# Patient Record
Sex: FEMALE | Race: BLACK OR AFRICAN AMERICAN | Employment: UNEMPLOYED | URBAN - METROPOLITAN AREA
[De-identification: names, ages, dates, MRNs, and addresses within clinical notes are randomized per-mention and may not be internally consistent; named-entity substitution may affect disease eponyms.]

---

## 2018-10-24 ENCOUNTER — HOSPITAL ENCOUNTER (EMERGENCY)
Age: 38
Discharge: HOME | End: 2018-10-24
Attending: EMERGENCY MEDICINE
Payer: MEDICAID

## 2018-10-24 VITALS
HEART RATE: 71 BPM | WEIGHT: 210 LBS | DIASTOLIC BLOOD PRESSURE: 72 MMHG | SYSTOLIC BLOOD PRESSURE: 127 MMHG | BODY MASS INDEX: 33.89 KG/M2 | RESPIRATION RATE: 16 BRPM | TEMPERATURE: 98 F | OXYGEN SATURATION: 100 %

## 2018-10-24 DIAGNOSIS — A59.9 TRICHOMONIASIS: Primary | ICD-10-CM

## 2018-10-24 LAB
BACTERIA WET PREP: ABNORMAL
BACTERIA: ABNORMAL /HPF
BILIRUBIN URINE: NEGATIVE
BLOOD, URINE: ABNORMAL
CLARITY: ABNORMAL
CLUE CELLS: ABNORMAL
COLOR: YELLOW
EPITHELIAL CELLS WET PREP: ABNORMAL
EPITHELIAL CELLS, UA: ABNORMAL /HPF
GLUCOSE URINE: NEGATIVE MG/DL
HCG(URINE) PREGNANCY TEST: NEGATIVE
KETONES, URINE: NEGATIVE MG/DL
LEUKOCYTE ESTERASE, URINE: ABNORMAL
MICROSCOPIC EXAMINATION: YES
NITRITE, URINE: NEGATIVE
PH UA: 6
PROTEIN UA: NEGATIVE MG/DL
RBC UA: ABNORMAL /HPF (ref 0–2)
RBC WET PREP: ABNORMAL
SOURCE WET PREP: ABNORMAL
SPECIFIC GRAVITY UA: >=1.03
TRICHOMONAS PREP: ABNORMAL
TRICHOMONAS: PRESENT /HPF
URINE TYPE: ABNORMAL
UROBILINOGEN, URINE: 1 E.U./DL
WBC UA: ABNORMAL /HPF (ref 0–5)
WBC WET PREP: ABNORMAL
YEAST WET PREP: ABNORMAL

## 2018-10-24 PROCEDURE — 96372 THER/PROPH/DIAG INJ SC/IM: CPT

## 2018-10-24 PROCEDURE — 81001 URINALYSIS AUTO W/SCOPE: CPT

## 2018-10-24 PROCEDURE — 84703 CHORIONIC GONADOTROPIN ASSAY: CPT

## 2018-10-24 PROCEDURE — 6370000000 HC RX 637 (ALT 250 FOR IP): Performed by: EMERGENCY MEDICINE

## 2018-10-24 PROCEDURE — 6360000002 HC RX W HCPCS: Performed by: EMERGENCY MEDICINE

## 2018-10-24 PROCEDURE — 87591 N.GONORRHOEAE DNA AMP PROB: CPT

## 2018-10-24 PROCEDURE — 87491 CHLMYD TRACH DNA AMP PROBE: CPT

## 2018-10-24 PROCEDURE — 87210 SMEAR WET MOUNT SALINE/INK: CPT

## 2018-10-24 PROCEDURE — 99283 EMERGENCY DEPT VISIT LOW MDM: CPT

## 2018-10-24 RX ORDER — METRONIDAZOLE 500 MG/1
500 TABLET ORAL 2 TIMES DAILY
Qty: 14 TABLET | Refills: 0 | Status: SHIPPED | OUTPATIENT
Start: 2018-10-24 | End: 2018-10-31

## 2018-10-24 RX ORDER — AZITHROMYCIN 250 MG/1
1000 TABLET, FILM COATED ORAL ONCE
Status: COMPLETED | OUTPATIENT
Start: 2018-10-24 | End: 2018-10-24

## 2018-10-24 RX ORDER — CEFTRIAXONE SODIUM 250 MG/1
250 INJECTION, POWDER, FOR SOLUTION INTRAMUSCULAR; INTRAVENOUS ONCE
Status: COMPLETED | OUTPATIENT
Start: 2018-10-24 | End: 2018-10-24

## 2018-10-24 RX ADMIN — AZITHROMYCIN 1000 MG: 250 TABLET, FILM COATED ORAL at 00:45

## 2018-10-24 RX ADMIN — CEFTRIAXONE SODIUM 250 MG: 250 INJECTION, POWDER, FOR SOLUTION INTRAMUSCULAR; INTRAVENOUS at 00:45

## 2018-10-24 ASSESSMENT — PAIN SCALES - GENERAL: PAINLEVEL_OUTOF10: 0

## 2018-10-25 LAB
C TRACH DNA GENITAL QL NAA+PROBE: NEGATIVE
N. GONORRHOEAE DNA: NEGATIVE

## 2019-05-13 ENCOUNTER — HOSPITAL ENCOUNTER (EMERGENCY)
Age: 39
Discharge: HOME | End: 2019-05-13
Attending: EMERGENCY MEDICINE

## 2019-05-13 VITALS
HEART RATE: 70 BPM | OXYGEN SATURATION: 97 % | TEMPERATURE: 98.5 F | WEIGHT: 212.38 LBS | BODY MASS INDEX: 34.28 KG/M2 | DIASTOLIC BLOOD PRESSURE: 65 MMHG | SYSTOLIC BLOOD PRESSURE: 132 MMHG

## 2019-05-13 DIAGNOSIS — B96.89 BACTERIAL VAGINOSIS: ICD-10-CM

## 2019-05-13 DIAGNOSIS — N76.0 BACTERIAL VAGINOSIS: ICD-10-CM

## 2019-05-13 DIAGNOSIS — A64 STD (FEMALE): Primary | ICD-10-CM

## 2019-05-13 LAB
BACTERIA WET PREP: ABNORMAL
BACTERIA: ABNORMAL /HPF
BILIRUBIN URINE: NEGATIVE
BLOOD, URINE: ABNORMAL
CLARITY: CLEAR
CLUE CELLS: ABNORMAL
COLOR: YELLOW
EPITHELIAL CELLS WET PREP: ABNORMAL
EPITHELIAL CELLS, UA: ABNORMAL /HPF
GLUCOSE URINE: NEGATIVE MG/DL
KETONES, URINE: ABNORMAL MG/DL
LEUKOCYTE ESTERASE, URINE: NEGATIVE
MICROSCOPIC EXAMINATION: YES
MUCUS: ABNORMAL /LPF
NITRITE, URINE: NEGATIVE
PH UA: 6 (ref 5–8)
PROTEIN UA: NEGATIVE MG/DL
RBC UA: ABNORMAL /HPF (ref 0–2)
RBC WET PREP: ABNORMAL
SOURCE WET PREP: ABNORMAL
SPECIFIC GRAVITY UA: 1.02 (ref 1–1.03)
TRICHOMONAS PREP: ABNORMAL
URINE REFLEX TO CULTURE: ABNORMAL
URINE TYPE: ABNORMAL
UROBILINOGEN, URINE: 0.2 E.U./DL
WBC UA: ABNORMAL /HPF (ref 0–5)
WBC WET PREP: ABNORMAL
YEAST WET PREP: ABNORMAL

## 2019-05-13 PROCEDURE — 99283 EMERGENCY DEPT VISIT LOW MDM: CPT

## 2019-05-13 PROCEDURE — 6360000002 HC RX W HCPCS: Performed by: EMERGENCY MEDICINE

## 2019-05-13 PROCEDURE — 87210 SMEAR WET MOUNT SALINE/INK: CPT

## 2019-05-13 PROCEDURE — 96372 THER/PROPH/DIAG INJ SC/IM: CPT

## 2019-05-13 PROCEDURE — 6370000000 HC RX 637 (ALT 250 FOR IP): Performed by: EMERGENCY MEDICINE

## 2019-05-13 PROCEDURE — 2580000003 HC RX 258

## 2019-05-13 PROCEDURE — 87491 CHLMYD TRACH DNA AMP PROBE: CPT

## 2019-05-13 PROCEDURE — 87591 N.GONORRHOEAE DNA AMP PROB: CPT

## 2019-05-13 PROCEDURE — 81001 URINALYSIS AUTO W/SCOPE: CPT

## 2019-05-13 RX ORDER — CEFTRIAXONE SODIUM 250 MG/1
250 INJECTION, POWDER, FOR SOLUTION INTRAMUSCULAR; INTRAVENOUS ONCE
Status: COMPLETED | OUTPATIENT
Start: 2019-05-13 | End: 2019-05-13

## 2019-05-13 RX ORDER — METRONIDAZOLE 500 MG/1
500 TABLET ORAL 2 TIMES DAILY
Qty: 14 TABLET | Refills: 0 | Status: SHIPPED | OUTPATIENT
Start: 2019-05-13 | End: 2019-05-20

## 2019-05-13 RX ORDER — AZITHROMYCIN 250 MG/1
1000 TABLET, FILM COATED ORAL ONCE
Status: COMPLETED | OUTPATIENT
Start: 2019-05-13 | End: 2019-05-13

## 2019-05-13 RX ADMIN — AZITHROMYCIN MONOHYDRATE 1000 MG: 250 TABLET ORAL at 12:06

## 2019-05-13 RX ADMIN — CEFTRIAXONE SODIUM 250 MG: 250 INJECTION, POWDER, FOR SOLUTION INTRAMUSCULAR; INTRAVENOUS at 12:07

## 2019-05-13 RX ADMIN — IBUPROFEN 600 MG: 200 TABLET, FILM COATED ORAL at 11:38

## 2019-05-13 RX ADMIN — WATER: 1 INJECTION INTRAMUSCULAR; INTRAVENOUS; SUBCUTANEOUS at 12:07

## 2019-05-13 ASSESSMENT — PAIN DESCRIPTION - DESCRIPTORS: DESCRIPTORS: PRESSURE

## 2019-05-13 ASSESSMENT — PAIN DESCRIPTION - ORIENTATION: ORIENTATION: LOWER

## 2019-05-13 ASSESSMENT — PAIN SCALES - GENERAL
PAINLEVEL_OUTOF10: 7

## 2019-05-13 ASSESSMENT — PAIN DESCRIPTION - LOCATION: LOCATION: BACK

## 2019-05-13 ASSESSMENT — PAIN DESCRIPTION - PAIN TYPE: TYPE: ACUTE PAIN

## 2019-05-13 NOTE — ED PROVIDER NOTES
CHIEF COMPLAINT  Vaginal Discharge      HISTORY OF PRESENT ILLNESS  Elisabeth Garcia is a 41 y.o. female, who presents to the ED with a several day history of whitish vaginal discharge with odor associated with moderate back and lower abdominal pain urinary itching and nausea no fever no vomiting. Patient is sexually active with 1 male partner does not use barrier protection. She is status post hysterectomy for dysmenorrhea she is . She has a history of trichomonas bacterial vaginosis and gonorrhea in the past.  Review of Systems    I have reviewed the following from the nursing documentation. Past Medical History:   Diagnosis Date    Gonorrhea     past.     Past Surgical History:   Procedure Laterality Date    HYSTERECTOMY       History reviewed. No pertinent family history.   Social History     Socioeconomic History    Marital status: Single     Spouse name: Not on file    Number of children: Not on file    Years of education: Not on file    Highest education level: Not on file   Occupational History    Not on file   Social Needs    Financial resource strain: Not on file    Food insecurity:     Worry: Not on file     Inability: Not on file    Transportation needs:     Medical: Not on file     Non-medical: Not on file   Tobacco Use    Smoking status: Current Every Day Smoker     Packs/day: 0.50     Types: Cigarettes    Smokeless tobacco: Never Used   Substance and Sexual Activity    Alcohol use: No    Drug use: No    Sexual activity: Not on file   Lifestyle    Physical activity:     Days per week: Not on file     Minutes per session: Not on file    Stress: Not on file   Relationships    Social connections:     Talks on phone: Not on file     Gets together: Not on file     Attends [employer] service: Not on file     Active member of club or organization: Not on file     Attends meetings of clubs or organizations: Not on file     Relationship status: Not on file    Intimate partner components within normal limits    Narrative:     Performed at:  [facility]  [address],  [place] [facility] [place]   Phone [phone]   URINE RT REFLEX TO CULTURE - Abnormal; Notable for the following components:    Ketones, Urine TRACE (*)     Blood, Urine TRACE-INTACT (*)     All other components within normal limits    Narrative:     Performed at:  [facility]  [address],  [place] [address]   Phone [phone]   MICROSCOPIC URINALYSIS - Abnormal; Notable for the following components:    Mucus, UA 1+ (*)     RBC, UA 3-5 (*)     Bacteria, UA 2+ (*)     All other components within normal limits    Narrative:     Performed at:  [facility]  [address],  [place] [address]   Phone [phone]   C. TRACHOMATIS N.GONORRHOEAE DNA        RADIOLOGY  No orders to display       If EKG done, EKG was interpreted independently by me    PROCEDURES  Procedures    EDCOURSE/MDM  Patient seen and evaluated. Old records selectively reviewed if pertinent. Labs and imaging reviewed and results discussed with patient. I considered urinary tract infection, STD including PID, bacterial vaginosis, vaginitis. TOA unlikely. Symptoms are bilateral, there are no masses on physical examination, no fever or other signs of sepsis/bacteremia. Given the symptoms of back and lower abdominal pain as well as discharge and the physical limitation findings of adnexal tenderness we will treat as possible PID/STD. Wet smear also shows evidence of possible BV will discharge patient with Flagyl for this    If Shakira Hill is discharged, I discussed with Shakira Hill and/or family the exam results, diagnosis, care, prognosis, reasons to return and the importance of follow up. Patient and/or family is in agreement with plan and all questions have been answered.   Specific discharge instructions explained, including reasons to return to the emergency department. If discharged, patient was given scripts for the following medications. New Prescriptions    METRONIDAZOLE (FLAGYL) 500 MG TABLET    Take 1 tablet by mouth 2 times daily for 7 days       CLINICAL IMPRESSION  1. STD (female)    2. Bacterial vaginosis        /65   Pulse 70   Temp 98.5 °F (36.9 °C) (Oral)   Wt 96.3 kg (212 lb 6 oz)   SpO2 97%   BMI 34.28 kg/m²     DISPOSITION  Christina Khanna was discharged in stable condition.                    Lucio Diaz MD  05/13/19 4233

## 2019-05-13 NOTE — ED NOTES
Patient c/o white, malodorous vaginal discharge. Patient states she is concerned about STD.      Karen Clifford RN  05/13/19 6959

## 2019-05-15 LAB
C TRACH DNA GENITAL QL NAA+PROBE: NEGATIVE
N. GONORRHOEAE DNA: NEGATIVE

## 2020-08-23 ENCOUNTER — HOSPITAL ENCOUNTER (EMERGENCY)
Age: 40
Discharge: HOME | End: 2020-08-23
Attending: EMERGENCY MEDICINE
Payer: MEDICAID

## 2020-08-23 VITALS
SYSTOLIC BLOOD PRESSURE: 114 MMHG | DIASTOLIC BLOOD PRESSURE: 68 MMHG | HEART RATE: 98 BPM | BODY MASS INDEX: 34.99 KG/M2 | OXYGEN SATURATION: 100 % | TEMPERATURE: 98.1 F | RESPIRATION RATE: 18 BRPM | WEIGHT: 210 LBS | HEIGHT: 65 IN

## 2020-08-23 PROCEDURE — 99282 EMERGENCY DEPT VISIT SF MDM: CPT

## 2020-08-23 RX ORDER — DIPHENHYDRAMINE HCL 25 MG
25 CAPSULE ORAL EVERY 6 HOURS PRN
Qty: 30 CAPSULE | Refills: 0 | Status: SHIPPED | OUTPATIENT
Start: 2020-08-23 | End: 2020-09-02

## 2020-08-23 ASSESSMENT — PAIN DESCRIPTION - DESCRIPTORS: DESCRIPTORS: ITCHING

## 2020-08-23 ASSESSMENT — PAIN DESCRIPTION - LOCATION: LOCATION: ABDOMEN;BACK

## 2020-08-23 ASSESSMENT — PAIN DESCRIPTION - PAIN TYPE: TYPE: ACUTE PAIN

## 2020-08-23 NOTE — ED PROVIDER NOTES
EMERGENCY DEPARTMENT PHYSICIAN DOCUMENTATION      CHIEF COMPLAINT  SKIN ISSUE    Patient information was obtained from patient. History/Exam limitations: none. HISTORY OF PRESENT ILLNESS  Don Todd is a 46 y.o. female with complaint of SKIN ISSUE    Location back, bilateral flanks, abdomen  Onset several days . Caused by unknown.  not painful, no radiation, worse with nothing  no fevers, no discharge  +mildly itching      REVIEW OF SYSTEMS  A full 10 point Review of Systems was performed and is negative aside from pertinent positives mentioned in HPI    ALLERGIES:  No Known Allergies    PAST HISTORY  Past Medical History:   Diagnosis Date    Gonorrhea     past.       History reviewed. No pertinent family history. No current facility-administered medications on file prior to encounter. Current Outpatient Medications on File Prior to Encounter   Medication Sig Dispense Refill    cephALEXin (KEFLEX) 500 MG capsule Take 1 capsule by mouth 4 times daily 28 capsule 0       Social History     Tobacco Use    Smoking status: Current Every Day Smoker     Packs/day: 0.50     Types: Cigarettes    Smokeless tobacco: Never Used   Substance Use Topics    Alcohol use: No    Drug use: No         EXAM:   Presentation Vital Signs: /68   Pulse 98   Temp 98.1 °F (36.7 °C)   Resp 18   Ht 5' 5\" (1.651 m)   Wt 210 lb (95.3 kg)   SpO2 100%   BMI 34.95 kg/m²     General: Well nourished, no acute distress  Head: No traumatic injury  ENT: MMM, no facial asymmetry, no nasal discharge  Eyes: EOM  Neck: No tracheal deviation or stridor  Lungs: No respiratory distress  Skin: on back, sides of trunk, and abdomen there are several scattered oblong/oval shaped slightly erythematous and raised lesions in a \"[place] pattern\"  Extremities: Normal ROM of bilateral upper extremities at shoulders, elbows, wrists; normal ROM of bilateral LE at hips and knees. Neurologic: Alert, oriented x 3.   No focal deficits upon moving arms and legs  Psychiatric: Appropriate demeanor without agitation or internal stimulation      Cathy Qureshi 16  Patient presents with pityriasis rosea--see rash description in exam.    Wait-and-see antifungal--but there is no central clearing and this appears quite classic pityriasis. Also benadryl for itch. Fu with derm if not resolved. DISPOSITION  Home    IMPRESSION:  1. Pityriasis rosea            This medical chart used with aid of transcription software. As such, there may be inadvertent errors in transcription of spellings and words despite physician's attempts to correct all possible errors.            Jany Leon MD  08/23/20 1218

## 2021-04-16 ENCOUNTER — HOSPITAL ENCOUNTER (EMERGENCY)
Age: 41
Discharge: HOME | End: 2021-04-16
Attending: EMERGENCY MEDICINE
Payer: MEDICAID

## 2021-04-16 VITALS
RESPIRATION RATE: 17 BRPM | TEMPERATURE: 98.4 F | WEIGHT: 238.6 LBS | HEART RATE: 84 BPM | BODY MASS INDEX: 39.71 KG/M2 | SYSTOLIC BLOOD PRESSURE: 121 MMHG | DIASTOLIC BLOOD PRESSURE: 67 MMHG | OXYGEN SATURATION: 99 %

## 2021-04-16 DIAGNOSIS — B96.89 BACTERIAL VAGINOSIS: Primary | ICD-10-CM

## 2021-04-16 DIAGNOSIS — N76.0 BACTERIAL VAGINOSIS: Primary | ICD-10-CM

## 2021-04-16 LAB
BACTERIA WET PREP: ABNORMAL
BACTERIA: ABNORMAL /HPF
BILIRUBIN URINE: NEGATIVE
BLOOD, URINE: ABNORMAL
CLARITY: CLEAR
CLUE CELLS: ABNORMAL
COLOR: YELLOW
EPITHELIAL CELLS WET PREP: ABNORMAL
EPITHELIAL CELLS, UA: ABNORMAL /HPF (ref 0–5)
GLUCOSE URINE: NEGATIVE MG/DL
KETONES, URINE: NEGATIVE MG/DL
LEUKOCYTE ESTERASE, URINE: ABNORMAL
MICROSCOPIC EXAMINATION: YES
NITRITE, URINE: NEGATIVE
PH UA: 5.5 (ref 5–8)
PROTEIN UA: NEGATIVE MG/DL
RBC UA: ABNORMAL /HPF (ref 0–4)
RBC WET PREP: ABNORMAL
SOURCE WET PREP: ABNORMAL
SPECIFIC GRAVITY UA: 1.02 (ref 1–1.03)
TRICHOMONAS PREP: ABNORMAL
URINE REFLEX TO CULTURE: ABNORMAL
URINE TYPE: ABNORMAL
UROBILINOGEN, URINE: 0.2 E.U./DL
WBC UA: ABNORMAL /HPF (ref 0–5)
WBC WET PREP: ABNORMAL
YEAST WET PREP: ABNORMAL

## 2021-04-16 PROCEDURE — 99283 EMERGENCY DEPT VISIT LOW MDM: CPT

## 2021-04-16 PROCEDURE — 87210 SMEAR WET MOUNT SALINE/INK: CPT

## 2021-04-16 PROCEDURE — 81001 URINALYSIS AUTO W/SCOPE: CPT

## 2021-04-16 RX ORDER — METRONIDAZOLE 500 MG/1
500 TABLET ORAL 2 TIMES DAILY
Qty: 14 TABLET | Refills: 0 | Status: SHIPPED | OUTPATIENT
Start: 2021-04-16 | End: 2021-04-23

## 2021-04-16 NOTE — ED PROVIDER NOTES
[facility] EMERGENCY DEPT VISIT      Patient Identification  Arpit Valladares is a 45 y.o. female. Chief Complaint   Urinary Tract Infection      History of Present Illness: This is a  45 y.o. female who presents ambulatory  to the ED with complaints of one week h/o urinary frequency and urgency with some occasional incontinence. No fever. No abdominal pain or flank pain. Mild low back pain. No nausea or vomiting. Had one episode of diarrhea this am. Has noticed a foul smell to urine and perineal region. Mild discharge. Has had hysterectomy. Past Medical History:   Diagnosis Date    Gonorrhea     past.       Past Surgical History:   Procedure Laterality Date    HYSTERECTOMY         No current facility-administered medications for this encounter.      Current Outpatient Medications:     ALPRAZolam (XANAX PO), Take 1 mg by mouth every 6 hours as needed, Disp: , Rfl:     metroNIDAZOLE (FLAGYL) 500 MG tablet, Take 1 tablet by mouth 2 times daily for 7 days, Disp: 14 tablet, Rfl: 0    No Known Allergies    Social History     Socioeconomic History    Marital status: Single     Spouse name: Not on file    Number of children: Not on file    Years of education: Not on file    Highest education level: Not on file   Occupational History    Not on file   Social Needs    Financial resource strain: Not on file    Food insecurity     Worry: Not on file     Inability: Not on file    Transportation needs     Medical: Not on file     Non-medical: Not on file   Tobacco Use    Smoking status: Current Every Day Smoker     Packs/day: 0.50     Types: Cigarettes    Smokeless tobacco: Never Used   Substance and Sexual Activity    Alcohol use: No    Drug use: No    Sexual activity: Not on file   Lifestyle    Physical activity     Days per week: Not on file     Minutes per session: Not on file    Stress: Not on file   Relationships    Social connections     Talks on phone: Not on file     Gets together: Not on file Attends [employer] service: Not on file     Active member of club or organization: Not on file     Attends meetings of clubs or organizations: Not on file     Relationship status: Not on file    Intimate partner violence     Fear of current or ex partner: Not on file     Emotionally abused: Not on file     Physically abused: Not on file     Forced sexual activity: Not on file   Other Topics Concern    Not on file   Social History Narrative    Not on file       Nursing Notes Reviewed      ROS:  General: no fever  ENT: no sinus congestion, no sore throat  RESP: no cough, no shortness of breath  CARDIAC: no chest pain  GI: no abdominal pain, no vomiting, no diarrhea  : + dysuria, no hematuria, no retention, + incontinence  Musculoskeletal: no arthralgia, no myalgia, no back pain,  no joint swelling  NEURO: no headache, no numbness, no weakness, no dizziness  DERM: no rash, no erythema, no ecchymosis, no wounds      PHYSICAL EXAM:  GENERAL APPEARANCE: Lance Goldsmith is in no acute respiratory distress. Awake and alert. VITAL SIGNS:   ED Triage Vitals [04/16/21 1131]   Enc Vitals Group      /67      Pulse 84      Resp 17      Temp 98.4 °F (36.9 °C)      Temp Source Oral      SpO2 99 %      Weight 238 lb 9.6 oz (108.2 kg)      Height       Head Circumference       Peak Flow       Pain Score       Pain Loc       Pain Edu? Excl. in [address]? HEAD: Normocephalic, atraumatic. EYES:  Extraocular muscles are intact. Conjunctivas are pink. Negative scleral icterus. ENT:  Mucous membranes are moist.  Pharynx without erythema or exudates. NECK: Nontender and supple. CHEST: Clear to auscultation bilaterally. No rales, rhonchi, or wheezing. HEART:  Regular rate and rhythm. No murmurs. Strong and equal pulses in the upper and lower extremities. ABDOMEN: Soft,  nondistended, positive bowel sounds. abdomen is nontender. No guarding.  No flank tenderness  MUSCULOSKELETAL:  Active range of motion of the upper and lower extremities. No edema. NEUROLOGICAL: Awake, alert and oriented x 3. Power intact in the upper and lower extremities. DERMATOLOGIC: No petechiae, rashes, or ecchymoses. ED COURSE AND MEDICAL DECISION MAKING:      Radiology:  All plain films have been evaluated by myself. They may have been overread by radiologist as noted in chart. Other radiologic studies (i.e. CT, MRI, ultrasounds, etc ) have been interpreted by radiologist.     No orders to display       Labs:  Results for orders placed or performed during the hospital encounter of 04/16/21   Wet prep, genital    Specimen: Vaginal   Result Value Ref Range    Trichomonas Prep None Seen     Yeast, Wet Prep None Seen     Clue Cells, Wet Prep 4+ (A)     WBC, Wet Prep 1+     RBC, Wet Prep None Seen     Epi Cells None Seen     Bacteria 4+     Source Wet Prep Vaginal    Urinalysis Reflex to Culture    Specimen: Urine, clean catch   Result Value Ref Range    Color, UA Yellow Straw/Yellow    Clarity, UA Clear Clear    Glucose, Ur Negative Negative mg/dL    Bilirubin Urine Negative Negative    Ketones, Urine Negative Negative mg/dL    Specific Gravity, UA 1.025 1.005 - 1.030    Blood, Urine TRACE-INTACT (A) Negative    pH, UA 5.5 5.0 - 8.0    Protein, UA Negative Negative mg/dL    Urobilinogen, Urine 0.2 <2.0 E.U./dL    Nitrite, Urine Negative Negative    Leukocyte Esterase, Urine TRACE (A) Negative    Microscopic Examination YES     Urine Type NotGiven     Urine Reflex to Culture Not Indicated    Microscopic Urinalysis   Result Value Ref Range    WBC, UA 3-5 0 - 5 /HPF    RBC, UA 0-2 0 - 4 /HPF    Epithelial Cells, UA 2-5 0 - 5 /HPF    Bacteria, UA 1+ (A) None Seen /HPF       Treatment in the department:  Patient received no meds while in the ED. Medical decision making:  Patient presents to ED with concerns for vaginal irritation and urinary symptoms.     Patient has no fever, toxicity, severe abdominal/pelvic pain, or vomiting and furthermore is hemodynamically stable and is not pregnant. I therefore feel there is low risk for PID, UROSEPSIS, OVARIAN TORSION, ECTOPIC PREGNANCY, MISCARRIAGE, ENDOMETRITIS, OVARIAN CYST RUPTURE WITH THREATENING HEMOPERITONEUM, TUBO-OVARIAN ABSCESS, PYELONEPHRITIS, UROSEPSIS,  I do not feel she requires emergent ultrasound imaging at this time. Clinical Impression:  1. Bacterial vaginosis        Dispo:  Patient will be discharged  at this time. Patient was informed of this decision and agrees with plan. I have discussed lab and xray findings with patient and they understand. Questions were answered to the best of my ability. Discharge vitals:  Blood pressure 121/67, pulse 84, temperature 98.4 °F (36.9 °C), temperature source Oral, resp. rate 17, weight 238 lb 9.6 oz (108.2 kg), SpO2 99 %, not currently breastfeeding. Prescriptions given:   Discharge Medication List as of 4/16/2021 12:42 PM      START taking these medications    Details   metroNIDAZOLE (FLAGYL) 500 MG tablet Take 1 tablet by mouth 2 times daily for 7 days, Disp-14 tablet, R-0Normal               This chart was created using dragon voice recognition software.         Reina Vines MD  04/16/21 2092

## 2021-04-16 NOTE — ED NOTES
Pt states that she has been having 2 days of \"wet\" underwear and thinks she is urinating on herself and states that there is foul odor and she is urinating more frequently.       Warren Cruz RN  04/16/21 9912

## 2021-08-05 ENCOUNTER — HOSPITAL ENCOUNTER (EMERGENCY)
Age: 41
Discharge: HOME | End: 2021-08-05
Attending: STUDENT IN AN ORGANIZED HEALTH CARE EDUCATION/TRAINING PROGRAM
Payer: MEDICAID

## 2021-08-05 ENCOUNTER — APPOINTMENT (OUTPATIENT)
Dept: CT IMAGING | Age: 41
End: 2021-08-05
Payer: MEDICAID

## 2021-08-05 ENCOUNTER — APPOINTMENT (OUTPATIENT)
Dept: GENERAL RADIOLOGY | Age: 41
End: 2021-08-05
Payer: MEDICAID

## 2021-08-05 VITALS
SYSTOLIC BLOOD PRESSURE: 132 MMHG | DIASTOLIC BLOOD PRESSURE: 82 MMHG | WEIGHT: 234.7 LBS | RESPIRATION RATE: 12 BRPM | BODY MASS INDEX: 39.06 KG/M2 | HEART RATE: 102 BPM | TEMPERATURE: 98.2 F | OXYGEN SATURATION: 98 %

## 2021-08-05 DIAGNOSIS — J06.9 VIRAL UPPER RESPIRATORY TRACT INFECTION: Primary | ICD-10-CM

## 2021-08-05 DIAGNOSIS — N30.00 ACUTE CYSTITIS WITHOUT HEMATURIA: ICD-10-CM

## 2021-08-05 LAB
A/G RATIO: 1.4 (ref 1.1–2.2)
ALBUMIN SERPL-MCNC: 4.8 G/DL (ref 3.4–5)
ALP BLD-CCNC: 84 U/L (ref 40–129)
ALT SERPL-CCNC: 17 U/L (ref 10–40)
ANION GAP SERPL CALCULATED.3IONS-SCNC: 14 MMOL/L (ref 3–16)
AST SERPL-CCNC: 19 U/L (ref 15–37)
BACTERIA: ABNORMAL /HPF
BASOPHILS ABSOLUTE: 0.1 K/UL (ref 0–0.2)
BASOPHILS RELATIVE PERCENT: 0.7 %
BILIRUB SERPL-MCNC: <0.2 MG/DL (ref 0–1)
BILIRUBIN URINE: NEGATIVE
BLOOD, URINE: ABNORMAL
BUN BLDV-MCNC: 3 MG/DL (ref 7–20)
CALCIUM SERPL-MCNC: 9.6 MG/DL (ref 8.3–10.6)
CHLORIDE BLD-SCNC: 99 MMOL/L (ref 99–110)
CLARITY: ABNORMAL
CO2: 25 MMOL/L (ref 21–32)
COLOR: YELLOW
CREAT SERPL-MCNC: <0.5 MG/DL (ref 0.6–1.1)
EKG ATRIAL RATE: 109 BPM
EKG DIAGNOSIS: NORMAL
EKG P AXIS: 59 DEGREES
EKG P-R INTERVAL: 158 MS
EKG Q-T INTERVAL: 334 MS
EKG QRS DURATION: 76 MS
EKG QTC CALCULATION (BAZETT): 449 MS
EKG R AXIS: 36 DEGREES
EKG T AXIS: 39 DEGREES
EKG VENTRICULAR RATE: 109 BPM
EOSINOPHILS ABSOLUTE: 0.2 K/UL (ref 0–0.6)
EOSINOPHILS RELATIVE PERCENT: 1.4 %
EPITHELIAL CELLS, UA: ABNORMAL /HPF (ref 0–5)
GFR AFRICAN AMERICAN: >60
GFR NON-AFRICAN AMERICAN: >60
GLOBULIN: 3.5 G/DL
GLUCOSE BLD-MCNC: 155 MG/DL (ref 70–99)
GLUCOSE URINE: NEGATIVE MG/DL
HCG(URINE) PREGNANCY TEST: NEGATIVE
HCT VFR BLD CALC: 44.3 % (ref 36–48)
HEMOGLOBIN: 15.4 G/DL (ref 12–16)
KETONES, URINE: ABNORMAL MG/DL
LEUKOCYTE ESTERASE, URINE: NEGATIVE
LYMPHOCYTES ABSOLUTE: 2.2 K/UL (ref 1–5.1)
LYMPHOCYTES RELATIVE PERCENT: 20.9 %
MCH RBC QN AUTO: 30.4 PG (ref 26–34)
MCHC RBC AUTO-ENTMCNC: 34.7 G/DL (ref 31–36)
MCV RBC AUTO: 87.5 FL (ref 80–100)
MICROSCOPIC EXAMINATION: YES
MONOCYTES ABSOLUTE: 1 K/UL (ref 0–1.3)
MONOCYTES RELATIVE PERCENT: 9.8 %
NEUTROPHILS ABSOLUTE: 7.1 K/UL (ref 1.7–7.7)
NEUTROPHILS RELATIVE PERCENT: 67.2 %
NITRITE, URINE: NEGATIVE
PDW BLD-RTO: 13.3 % (ref 12.4–15.4)
PH UA: 5.5 (ref 5–8)
PLATELET # BLD: 316 K/UL (ref 135–450)
PMV BLD AUTO: 6.9 FL (ref 5–10.5)
POTASSIUM REFLEX MAGNESIUM: 3.6 MMOL/L (ref 3.5–5.1)
PROTEIN UA: NEGATIVE MG/DL
RBC # BLD: 5.07 M/UL (ref 4–5.2)
RBC UA: ABNORMAL /HPF (ref 0–4)
SARS-COV-2: NOT DETECTED
SODIUM BLD-SCNC: 138 MMOL/L (ref 136–145)
SPECIFIC GRAVITY UA: >=1.03 (ref 1–1.03)
TOTAL PROTEIN: 8.3 G/DL (ref 6.4–8.2)
TROPONIN: <0.01 NG/ML
URINE REFLEX TO CULTURE: YES
URINE TYPE: ABNORMAL
UROBILINOGEN, URINE: 0.2 E.U./DL
WBC # BLD: 10.5 K/UL (ref 4–11)
WBC UA: ABNORMAL /HPF (ref 0–5)

## 2021-08-05 PROCEDURE — 81001 URINALYSIS AUTO W/SCOPE: CPT

## 2021-08-05 PROCEDURE — 2580000003 HC RX 258: Performed by: STUDENT IN AN ORGANIZED HEALTH CARE EDUCATION/TRAINING PROGRAM

## 2021-08-05 PROCEDURE — 93010 ELECTROCARDIOGRAM REPORT: CPT | Performed by: INTERNAL MEDICINE

## 2021-08-05 PROCEDURE — U0005 INFEC AGEN DETEC AMPLI PROBE: HCPCS

## 2021-08-05 PROCEDURE — U0003 INFECTIOUS AGENT DETECTION BY NUCLEIC ACID (DNA OR RNA); SEVERE ACUTE RESPIRATORY SYNDROME CORONAVIRUS 2 (SARS-COV-2) (CORONAVIRUS DISEASE [COVID-19]), AMPLIFIED PROBE TECHNIQUE, MAKING USE OF HIGH THROUGHPUT TECHNOLOGIES AS DESCRIBED BY CMS-2020-01-R: HCPCS

## 2021-08-05 PROCEDURE — 6360000004 HC RX CONTRAST MEDICATION: Performed by: STUDENT IN AN ORGANIZED HEALTH CARE EDUCATION/TRAINING PROGRAM

## 2021-08-05 PROCEDURE — 84484 ASSAY OF TROPONIN QUANT: CPT

## 2021-08-05 PROCEDURE — 87086 URINE CULTURE/COLONY COUNT: CPT

## 2021-08-05 PROCEDURE — 93005 ELECTROCARDIOGRAM TRACING: CPT | Performed by: STUDENT IN AN ORGANIZED HEALTH CARE EDUCATION/TRAINING PROGRAM

## 2021-08-05 PROCEDURE — 6370000000 HC RX 637 (ALT 250 FOR IP): Performed by: STUDENT IN AN ORGANIZED HEALTH CARE EDUCATION/TRAINING PROGRAM

## 2021-08-05 PROCEDURE — 80053 COMPREHEN METABOLIC PANEL: CPT

## 2021-08-05 PROCEDURE — 6360000002 HC RX W HCPCS: Performed by: STUDENT IN AN ORGANIZED HEALTH CARE EDUCATION/TRAINING PROGRAM

## 2021-08-05 PROCEDURE — 71046 X-RAY EXAM CHEST 2 VIEWS: CPT

## 2021-08-05 PROCEDURE — 84703 CHORIONIC GONADOTROPIN ASSAY: CPT

## 2021-08-05 PROCEDURE — 71260 CT THORAX DX C+: CPT

## 2021-08-05 PROCEDURE — 94640 AIRWAY INHALATION TREATMENT: CPT

## 2021-08-05 PROCEDURE — 85025 COMPLETE CBC W/AUTO DIFF WBC: CPT

## 2021-08-05 PROCEDURE — 99285 EMERGENCY DEPT VISIT HI MDM: CPT

## 2021-08-05 PROCEDURE — 96374 THER/PROPH/DIAG INJ IV PUSH: CPT

## 2021-08-05 RX ORDER — IPRATROPIUM BROMIDE AND ALBUTEROL SULFATE 2.5; .5 MG/3ML; MG/3ML
3 SOLUTION RESPIRATORY (INHALATION) ONCE
Status: COMPLETED | OUTPATIENT
Start: 2021-08-05 | End: 2021-08-05

## 2021-08-05 RX ORDER — 0.9 % SODIUM CHLORIDE 0.9 %
1000 INTRAVENOUS SOLUTION INTRAVENOUS ONCE
Status: COMPLETED | OUTPATIENT
Start: 2021-08-05 | End: 2021-08-05

## 2021-08-05 RX ORDER — DEXAMETHASONE SODIUM PHOSPHATE 4 MG/ML
10 INJECTION, SOLUTION INTRA-ARTICULAR; INTRALESIONAL; INTRAMUSCULAR; INTRAVENOUS; SOFT TISSUE ONCE
Status: COMPLETED | OUTPATIENT
Start: 2021-08-05 | End: 2021-08-05

## 2021-08-05 RX ORDER — ALBUTEROL SULFATE 90 UG/1
2 AEROSOL, METERED RESPIRATORY (INHALATION) 4 TIMES DAILY PRN
Qty: 1 INHALER | Refills: 0 | Status: SHIPPED | OUTPATIENT
Start: 2021-08-05

## 2021-08-05 RX ORDER — PREDNISONE 50 MG/1
50 TABLET ORAL DAILY
Qty: 5 TABLET | Refills: 0 | Status: SHIPPED | OUTPATIENT
Start: 2021-08-05 | End: 2021-08-10

## 2021-08-05 RX ORDER — LEVOFLOXACIN 500 MG/1
750 TABLET, FILM COATED ORAL DAILY
Qty: 11 TABLET | Refills: 0 | Status: SHIPPED | OUTPATIENT
Start: 2021-08-05 | End: 2021-08-12

## 2021-08-05 RX ORDER — IBUPROFEN 600 MG/1
600 TABLET ORAL ONCE
Status: COMPLETED | OUTPATIENT
Start: 2021-08-05 | End: 2021-08-05

## 2021-08-05 RX ADMIN — IOPAMIDOL 80 ML: 755 INJECTION, SOLUTION INTRAVENOUS at 03:30

## 2021-08-05 RX ADMIN — DEXAMETHASONE SODIUM PHOSPHATE 10 MG: 4 INJECTION, SOLUTION INTRAMUSCULAR; INTRAVENOUS at 01:57

## 2021-08-05 RX ADMIN — IBUPROFEN 600 MG: 600 TABLET, FILM COATED ORAL at 03:46

## 2021-08-05 RX ADMIN — IPRATROPIUM BROMIDE AND ALBUTEROL SULFATE 3 AMPULE: .5; 3 SOLUTION RESPIRATORY (INHALATION) at 01:22

## 2021-08-05 RX ADMIN — SODIUM CHLORIDE 1000 ML: 9 INJECTION, SOLUTION INTRAVENOUS at 03:36

## 2021-08-05 RX ADMIN — SODIUM CHLORIDE 1000 ML: 9 INJECTION, SOLUTION INTRAVENOUS at 01:57

## 2021-08-05 ASSESSMENT — PAIN DESCRIPTION - FREQUENCY: FREQUENCY: CONTINUOUS

## 2021-08-05 ASSESSMENT — PAIN DESCRIPTION - PAIN TYPE: TYPE: ACUTE PAIN

## 2021-08-05 ASSESSMENT — PAIN DESCRIPTION - LOCATION: LOCATION: GENERALIZED

## 2021-08-05 ASSESSMENT — PAIN SCALES - GENERAL
PAINLEVEL_OUTOF10: 9
PAINLEVEL_OUTOF10: 6
PAINLEVEL_OUTOF10: 9

## 2021-08-05 ASSESSMENT — PAIN DESCRIPTION - PROGRESSION: CLINICAL_PROGRESSION: GRADUALLY WORSENING

## 2021-08-05 ASSESSMENT — PAIN DESCRIPTION - DESCRIPTORS: DESCRIPTORS: ACHING

## 2021-08-05 NOTE — ED PROVIDER NOTES
Primary Care Physician: Referring Not In System (Inactive)   Attending Physician: Walker Welch MD     History   Chief Complaint   Patient presents with    Other     sweats, dehydration, cough         HPI   David Valencia is a 45 y.o. female with no significant medical history currently smokes presenting complaining of shortness of breath associated with cough that has been going on now for a few days. Patient is also wheezing but denies any history of asthma or COPD in the past.  Denies any exposures to persons infected with coronavirus. Denies any fevers but admits to sweats and feels dehydrated with a cough which is persisted. She does not have any leg swelling. No coronary artery disease or history in the past.    Past Medical History:   Diagnosis Date    Gonorrhea     past.        Past Surgical History:   Procedure Laterality Date    HYSTERECTOMY          History reviewed. No pertinent family history. Social History     Socioeconomic History    Marital status: Single     Spouse name: None    Number of children: None    Years of education: None    Highest education level: None   Occupational History    None   Tobacco Use    Smoking status: Current Every Day Smoker     Packs/day: 0.50     Types: Cigarettes    Smokeless tobacco: Never Used   Substance and Sexual Activity    Alcohol use: No    Drug use: No    Sexual activity: None   Other Topics Concern    None   Social History Narrative    None     Social Determinants of Health     Financial Resource Strain:     Difficulty of Paying Living Expenses:    Food Insecurity:     Worried About Running Out of Food in the Last Year:     Ran Out of Food in the Last Year:    Transportation Needs:     Lack of Transportation (Medical):      Lack of Transportation (Non-Medical):    Physical Activity:     Days of Exercise per Week:     Minutes of Exercise per Session:    Stress:     Feeling of Stress :    Social Connections:     Frequency of Communication with Friends and Family:     Frequency of Social Gatherings with Friends and Family:     Attends Muslim Services:     Active Member of Clubs or Organizations:     Attends Club or Organization Meetings:     Marital Status:    Intimate Partner Violence:     Fear of Current or Ex-Partner:     Emotionally Abused:     Physically Abused:     Sexually Abused:         Review of Systems   10 total systems reviewed and found to be negative unless otherwise noted in HPI     Physical Exam   /82   Pulse 102   Temp 98.2 °F (36.8 °C) (Oral)   Resp 12   Wt 234 lb 11.2 oz (106.5 kg)   SpO2 98%   BMI 39.06 kg/m²      CONSTITUTIONAL: Well appearing, in no acute distress with persistent cough with no sputum  HEAD: atraumatic, normocephalic   EYES: PERRL, No injection, discharge or scleral icterus. ENT: Moist mucous membranes. NECK: Normal ROM, NO LAD   CARDIOVASCULAR: Tachycardic no murmurs or gallop. PULMONARY/CHEST: Airway patent. No retractions. Breath sounds clear with good air entry bilaterally. ABDOMEN: Soft, Non-distended and non-tender, without guarding or rebound. SKIN: Acyanotic, warm, dry   MUSCULOSKELETAL: No swelling, tenderness or deformity   NEUROLOGICAL: Awake and oriented x 3. Pulses intact. Grossly nonfocal   Nursing note and vitals reviewed.      ED Course & Medical Decision Making   Medications   0.9 % sodium chloride bolus (0 mLs Intravenous Stopped 8/5/21 0311)   ipratropium-albuterol (DUONEB) nebulizer solution 3 ampule (3 ampules Inhalation Given 8/5/21 0122)   dexamethasone (DECADRON) injection 10 mg (10 mg Intravenous Given 8/5/21 0157)   0.9 % sodium chloride bolus (0 mLs Intravenous Stopped 8/5/21 0411)   iopamidol (ISOVUE-370) 76 % injection 80 mL (80 mLs Intravenous Given 8/5/21 0330)   ibuprofen (ADVIL;MOTRIN) tablet 600 mg (600 mg Oral Given 8/5/21 0346)      Labs Reviewed   COMPREHENSIVE METABOLIC PANEL W/ REFLEX TO MG FOR LOW K - Abnormal; Notable for the following components:       Result Value    Glucose 155 (*)     BUN 3 (*)     CREATININE <0.5 (*)     Total Protein 8.3 (*)     All other components within normal limits    Narrative:     Performed at:  [place] [address]   Phone [phone]   URINE RT REFLEX TO CULTURE - Abnormal; Notable for the following components:    Clarity, UA SL CLOUDY (*)     Ketones, Urine TRACE (*)     Blood, Urine TRACE-INTACT (*)     All other components within normal limits    Narrative:     Performed at:  [place] [address]   Phone [phone]   MICROSCOPIC URINALYSIS - Abnormal; Notable for the following components:    WBC, UA 10-20 (*)     Epithelial Cells, UA 21-50 (*)     Bacteria, UA 3+ (*)     All other components within normal limits    Narrative:     Performed at:  [place] [address]   Phone [phone]   CULTURE, URINE   CBC WITH AUTO DIFFERENTIAL    Narrative:     Performed at:  [place] [address]   Phone [phone]   PREGNANCY, URINE    Narrative:     Performed at:  [place] [address]   Phone [phone]   TROPONIN    Narrative:     Performed at:  [place] [address]   Phone [phone]      CT CHEST PULMONARY EMBOLISM W CONTRAST   Final Result   1. No large central PE.     2.  Patchy reticulonodular infiltrates. Followup imaging may be    considered in a high-risk patient. 3.  Additional findings, as above. XR CHEST (2 VW)   Final Result     Accentuation of interstitial markings. No confluent consolidation. No results found.      EKG INTERPRETATION:  EKG by my preliminary interpretation shows sinus tach with a rate of 109 normal axis, normal intervals, with no ST changes indicative of ischemia at this time. PROCEDURES:   Procedures    ASSESSMENT AND PLAN:  Franky Osullivan is a 45 y.o. female senting with a cough and shortness of breath that has been going on for some days. On exam patient was satting above 95% on room air. She was wheezing tachycardic. Labs obtained with no signs of infection. I did also obtain a COVID-19 test which is pending. X-ray at that time was unremarkable. Patient continues to complain of shortness of breath with a cough with tachycardia. As a result a CT of the chest was obtained showed no PE but some finding concerning for  interstitial infiltrates. This was discussed with patient and I recommended that she follows up with her primary care doctor for further evaluation including a pulmonology consult. She was discharged home with inhalers antibiotics and steroids. ClINICAL IMPRESSION:  1. Viral upper respiratory tract infection    2. Acute cystitis without hematuria          PATIENT REFERRED TO:  [facility]) Pre-Services  [phone]  Schedule an appointment as soon as possible for a visit in 2 days        DISCHARGE MEDICATIONS:  New Prescriptions    ALBUTEROL SULFATE HFA (VENTOLIN HFA) 108 (90 BASE) MCG/ACT INHALER    Inhale 2 puffs into the lungs 4 times daily as needed for Wheezing    LEVOFLOXACIN (LEVAQUIN) 500 MG TABLET    Take 1.5 tablets by mouth daily for 7 days    PREDNISONE (DELTASONE) 50 MG TABLET    Take 1 tablet by mouth daily for 5 days     DISCONTINUED MEDICATIONS:  Discontinued Medications    ALPRAZOLAM (XANAX PO)    Take 1 mg by mouth every 6 hours as needed     DISPOSITION Decision To Discharge 08/05/2021 02:54:54 AM  -We have instructed the patient, Franky Osullivan) to return to the ED or call her PCP if her pain/symptoms worsen. -Findings and recommendations explained to patient. She expressed understanding and agreed with the plan. David Woodward MD (electronically signed)  8/5/2021  _________________________________________________________________________________________  _________________________________________________________________________________________  This record is transcribed utilizing voice recognition technology. There are inherent limitations in this technology. In addition, there may be limitations in editing of this report. If there are any discrepancies, please contact me directly.         David Woodward MD  08/05/21 7935

## 2021-08-05 NOTE — ED NOTES
Pt dc/d with instructions and rx's in stable condition, ambulatory to lobby. Home per ride.       Wu Andrea RN  08/05/21 9427

## 2021-08-06 LAB — URINE CULTURE, ROUTINE: NORMAL

## 2022-02-24 ENCOUNTER — HOSPITAL ENCOUNTER (EMERGENCY)
Age: 42
Discharge: HOME | End: 2022-02-24
Attending: EMERGENCY MEDICINE
Payer: MEDICAID

## 2022-02-24 VITALS
RESPIRATION RATE: 18 BRPM | BODY MASS INDEX: 37.28 KG/M2 | SYSTOLIC BLOOD PRESSURE: 142 MMHG | WEIGHT: 232 LBS | HEART RATE: 88 BPM | HEIGHT: 66 IN | OXYGEN SATURATION: 100 % | DIASTOLIC BLOOD PRESSURE: 72 MMHG | TEMPERATURE: 98.5 F

## 2022-02-24 DIAGNOSIS — Z20.2 POSSIBLE EXPOSURE TO STD: ICD-10-CM

## 2022-02-24 DIAGNOSIS — N76.0 BACTERIAL VAGINOSIS: ICD-10-CM

## 2022-02-24 DIAGNOSIS — B96.89 BACTERIAL VAGINOSIS: ICD-10-CM

## 2022-02-24 DIAGNOSIS — R10.30 LOWER ABDOMINAL PAIN: ICD-10-CM

## 2022-02-24 DIAGNOSIS — J02.9 ACUTE PHARYNGITIS, UNSPECIFIED ETIOLOGY: Primary | ICD-10-CM

## 2022-02-24 DIAGNOSIS — R19.7 DIARRHEA, UNSPECIFIED TYPE: ICD-10-CM

## 2022-02-24 LAB
BACTERIA WET PREP: ABNORMAL
BACTERIA: ABNORMAL /HPF
BILIRUBIN URINE: NEGATIVE
BLOOD, URINE: ABNORMAL
CLARITY: CLEAR
CLUE CELLS: ABNORMAL
COLOR: YELLOW
EPITHELIAL CELLS WET PREP: ABNORMAL
EPITHELIAL CELLS, UA: ABNORMAL /HPF (ref 0–5)
GLUCOSE URINE: NEGATIVE MG/DL
KETONES, URINE: NEGATIVE MG/DL
LEUKOCYTE ESTERASE, URINE: NEGATIVE
MICROSCOPIC EXAMINATION: YES
MUCUS: ABNORMAL /LPF
NITRITE, URINE: NEGATIVE
PH UA: 5.5 (ref 5–8)
PROTEIN UA: ABNORMAL MG/DL
RAPID INFLUENZA  B AGN: NEGATIVE
RAPID INFLUENZA A AGN: NEGATIVE
RBC UA: ABNORMAL /HPF (ref 0–4)
RBC WET PREP: ABNORMAL
S PYO AG THROAT QL: NEGATIVE
SOURCE WET PREP: ABNORMAL
SPECIFIC GRAVITY UA: >=1.03 (ref 1–1.03)
TRICHOMONAS PREP: ABNORMAL
URINE TYPE: ABNORMAL
UROBILINOGEN, URINE: 0.2 E.U./DL
WBC UA: ABNORMAL /HPF (ref 0–5)
WBC WET PREP: ABNORMAL
YEAST WET PREP: ABNORMAL

## 2022-02-24 PROCEDURE — 87591 N.GONORRHOEAE DNA AMP PROB: CPT

## 2022-02-24 PROCEDURE — 87880 STREP A ASSAY W/OPTIC: CPT

## 2022-02-24 PROCEDURE — 99284 EMERGENCY DEPT VISIT MOD MDM: CPT

## 2022-02-24 PROCEDURE — 6360000002 HC RX W HCPCS: Performed by: EMERGENCY MEDICINE

## 2022-02-24 PROCEDURE — 96372 THER/PROPH/DIAG INJ SC/IM: CPT

## 2022-02-24 PROCEDURE — U0005 INFEC AGEN DETEC AMPLI PROBE: HCPCS

## 2022-02-24 PROCEDURE — 6370000000 HC RX 637 (ALT 250 FOR IP): Performed by: EMERGENCY MEDICINE

## 2022-02-24 PROCEDURE — 87210 SMEAR WET MOUNT SALINE/INK: CPT

## 2022-02-24 PROCEDURE — 87804 INFLUENZA ASSAY W/OPTIC: CPT

## 2022-02-24 PROCEDURE — 87081 CULTURE SCREEN ONLY: CPT

## 2022-02-24 PROCEDURE — 81001 URINALYSIS AUTO W/SCOPE: CPT

## 2022-02-24 PROCEDURE — 87491 CHLMYD TRACH DNA AMP PROBE: CPT

## 2022-02-24 PROCEDURE — U0003 INFECTIOUS AGENT DETECTION BY NUCLEIC ACID (DNA OR RNA); SEVERE ACUTE RESPIRATORY SYNDROME CORONAVIRUS 2 (SARS-COV-2) (CORONAVIRUS DISEASE [COVID-19]), AMPLIFIED PROBE TECHNIQUE, MAKING USE OF HIGH THROUGHPUT TECHNOLOGIES AS DESCRIBED BY CMS-2020-01-R: HCPCS

## 2022-02-24 RX ORDER — METRONIDAZOLE 500 MG/1
500 TABLET ORAL 2 TIMES DAILY
Qty: 14 TABLET | Refills: 0 | Status: SHIPPED | OUTPATIENT
Start: 2022-02-24 | End: 2022-03-03

## 2022-02-24 RX ORDER — CEFTRIAXONE 500 MG/1
500 INJECTION, POWDER, FOR SOLUTION INTRAMUSCULAR; INTRAVENOUS ONCE
Status: COMPLETED | OUTPATIENT
Start: 2022-02-24 | End: 2022-02-24

## 2022-02-24 RX ORDER — IBUPROFEN 800 MG/1
800 TABLET ORAL ONCE
Status: COMPLETED | OUTPATIENT
Start: 2022-02-24 | End: 2022-02-24

## 2022-02-24 RX ORDER — DOXYCYCLINE HYCLATE 100 MG
100 TABLET ORAL 2 TIMES DAILY
Qty: 14 TABLET | Refills: 0 | Status: SHIPPED | OUTPATIENT
Start: 2022-02-24 | End: 2022-03-03

## 2022-02-24 RX ADMIN — IBUPROFEN 800 MG: 800 TABLET, FILM COATED ORAL at 15:06

## 2022-02-24 RX ADMIN — CEFTRIAXONE SODIUM 500 MG: 500 INJECTION, POWDER, FOR SOLUTION INTRAMUSCULAR; INTRAVENOUS at 15:06

## 2022-02-24 NOTE — ED NOTES
Patient given prescription, work note, discharge instructions verbal and written, patient verbalized understanding. Alert/oriented X4, Clear speech.   Patient exhibits no distress, ambulates with steady gait per self leaving unit, no further request.     Yoselin Hughes RN  02/24/22 0905

## 2022-02-24 NOTE — Clinical Note
Tammy Ornelas was seen and treated in our emergency department on 2/24/2022. She may return to work on 02/26/2022. If covid negative     If you have any questions or concerns, please don't hesitate to call.       Osei Muse MD

## 2022-02-24 NOTE — ED PROVIDER NOTES
[facility] EMERGENCY DEPT VISIT      Patient Identification  Tito Marino is a 37 y.o. female. Chief Complaint   Exposure to STD      History of Present Illness: This is a  37 y.o. female who presents ambulatory  to the ED with complaints of headache, sore throat, diarrhea, and vaginal discharge. Patient states that she did have unprotected sex 1 to 2 weeks ago as well as oral sex. The last 3 days she has developed a headache with subjective fevers, sore throat, diarrhea, and some crampy lower abdominal pain. She denies dysuria, frequency, urgency. She has had vaginal discharge and odor. She has had a hysterectomy in the past.  The abdominal cramping is relatively mild. She denies sinus congestion or cough. No chest pain or shortness of breath. Past Medical History:   Diagnosis Date    Gonorrhea     past.       Past Surgical History:   Procedure Laterality Date    HYSTERECTOMY         No current facility-administered medications for this encounter.     Current Outpatient Medications:     doxycycline hyclate (VIBRA-TABS) 100 MG tablet, Take 1 tablet by mouth 2 times daily for 7 days, Disp: 14 tablet, Rfl: 0    metroNIDAZOLE (FLAGYL) 500 MG tablet, Take 1 tablet by mouth 2 times daily for 7 days, Disp: 14 tablet, Rfl: 0    albuterol sulfate HFA (VENTOLIN HFA) 108 (90 Base) MCG/ACT inhaler, Inhale 2 puffs into the lungs 4 times daily as needed for Wheezing, Disp: 1 Inhaler, Rfl: 0    No Known Allergies    Social History     Socioeconomic History    Marital status: Single     Spouse name: Not on file    Number of children: Not on file    Years of education: Not on file    Highest education level: Not on file   Occupational History    Not on file   Tobacco Use    Smoking status: Current Every Day Smoker     Packs/day: 0.50     Types: Cigarettes    Smokeless tobacco: Never Used   Substance and Sexual Activity    Alcohol use: No    Drug use: No    Sexual activity: Not on file   Other Topics Concern    Not on file   Social History Narrative    Not on file     Social Determinants of Health     Financial Resource Strain:     Difficulty of Paying Living Expenses: Not on file   Food Insecurity:     Worried About Running Out of Food in the Last Year: Not on file    Allison of Food in the Last Year: Not on file   Transportation Needs:     Lack of Transportation (Medical): Not on file    Lack of Transportation (Non-Medical): Not on file   Physical Activity:     Days of Exercise per Week: Not on file    Minutes of Exercise per Session: Not on file   Stress:     Feeling of Stress : Not on file   Social Connections:     Frequency of Communication with Friends and Family: Not on file    Frequency of Social Gatherings with Friends and Family: Not on file    Attends [employer]ism Services: Not on file    Active Member of [address] [place] [address] or Organizations: Not on file    Attends Club or Organization Meetings: Not on file    Marital Status: Not on file   Intimate Partner Violence:     Fear of Current or Ex-Partner: Not on file    Emotionally Abused: Not on file    Physically Abused: Not on file    Sexually Abused: Not on file   Housing Stability:     Unable to Pay for Housing in the Last Year: Not on file    Number of [place] in the Last Year: Not on file    Unstable Housing in the Last Year: Not on file       Nursing Notes Reviewed      ROS:  General: no fever  ENT: no sinus congestion, + sore throat  RESP: no cough, no shortness of breath  CARDIAC: no chest pain  GI: no abdominal pain, no vomiting, + diarrhea  : no dysuria, no hematuria, no retention, no incontinence  Musculoskeletal: no arthralgia, no myalgia, no back pain,  no joint swelling  NEURO: + headache, no numbness, no weakness, no dizziness  DERM: no rash, no erythema, no ecchymosis, no wounds      PHYSICAL EXAM:  GENERAL APPEARANCE: Liza Marks is in no acute respiratory distress. Awake and alert.   VITAL SIGNS:   ED Triage Vitals [02/24/22 1351]   Enc Vitals Group      BP (!) 142/72      Pulse 88      Resp 18      Temp 98.5 °F (36.9 °C)      Temp Source Oral      SpO2 100 %      Weight 232 lb (105.2 kg)      Height 5' 6\" (1.676 m)      Head Circumference       Peak Flow       Pain Score       Pain Loc       Pain Edu? Excl. in [address]? HEAD: Normocephalic, atraumatic. EYES:  Extraocular muscles are intact. Conjunctivas are pink. Negative scleral icterus. ENT:  Mucous membranes are moist.  Pharynx without erythema or exudates. NECK: Nontender and supple. Cervical adenopathy present  CHEST: Clear to auscultation bilaterally. No rales, rhonchi, or wheezing. HEART:  Regular rate and rhythm. No murmurs. Strong and equal pulses in the upper and lower extremities. ABDOMEN: Soft,  nondistended, positive bowel sounds. abdomen is minimally tender across lower abdomen, no localized RLQ tenderness. MUSCULOSKELETAL:  Active range of motion of the upper and lower extremities. No edema. NEUROLOGICAL: Awake, alert and oriented x 3. Power intact in the upper and lower extremities. DERMATOLOGIC: No petechiae, rashes, or ecchymoses. ED COURSE AND MEDICAL DECISION MAKING:      Radiology:  All plain films have been evaluated by myself. They may have been overread by radiologist as noted in chart.  Other radiologic studies (i.e. CT, MRI, ultrasounds, etc ) have been interpreted by radiologist.     No orders to display       Labs:  Results for orders placed or performed during the hospital encounter of 02/24/22   Wet Prep, Genital    Specimen: Vaginal   Result Value Ref Range    Trichomonas Prep None Seen     Yeast, Wet Prep None Seen     Clue Cells, Wet Prep 3+ (A)     WBC, Wet Prep 1+     RBC, Wet Prep 1+     Epi Cells 3+     Bacteria 2+     Source Wet Prep Vaginal    Strep Screen Group A Throat    Specimen: Throat   Result Value Ref Range    Rapid Strep A Screen Negative Negative   Rapid influenza A/B antigens    Specimen: Nasopharyngeal Result Value Ref Range    Rapid Influenza A Ag Negative Negative    Rapid Influenza B Ag Negative Negative   Urinalysis   Result Value Ref Range    Color, UA Yellow Straw/Yellow    Clarity, UA Clear Clear    Glucose, Ur Negative Negative mg/dL    Bilirubin Urine Negative Negative    Ketones, Urine Negative Negative mg/dL    Specific Gravity, UA >=1.030 1.005 - 1.030    Blood, Urine TRACE (A) Negative    pH, UA 5.5 5.0 - 8.0    Protein, UA TRACE (A) Negative mg/dL    Urobilinogen, Urine 0.2 <2.0 E.U./dL    Nitrite, Urine Negative Negative    Leukocyte Esterase, Urine Negative Negative    Microscopic Examination YES     Urine Type NotGiven    Microscopic Urinalysis   Result Value Ref Range    Mucus, UA 3+ (A) None Seen /LPF    WBC, UA 3-5 0 - 5 /HPF    RBC, UA 3-4 0 - 4 /HPF    Epithelial Cells, UA 11-20 (A) 0 - 5 /HPF    Bacteria, UA 2+ (A) None Seen /HPF       Treatment in the department:  Patient received   Medications   ibuprofen (ADVIL;MOTRIN) tablet 800 mg (800 mg Oral Given 2/24/22 1506)   cefTRIAXone (ROCEPHIN) injection 500 mg (500 mg IntraMUSCular Given 2/24/22 1506)     covid test sent and pending    Medical decision making:  Patient with headache, sore throat, abdominal pain, vaginal discharge, and diarrhea. Onset after oral sex so concern for gonorrheal pharyngitis and vaginitis. Cultures sent and treated empirically. Strep negative. Rapid flu negative. covid sent and pending. No signs of peritonsillar abscess or airway compromise. Has BV which will be treated and may be causing vaginal discharge. Benign abdominal exam. Has had diarrhea which may be causing her mild abdominal pain. With prior hysterectomy, PID not likely. No UTI. No localized tenderness to RLQ to suggest appendicitis.      I estimate there is LOW risk for ACUTE APPENDICITIS, BOWEL OBSTRUCTION, CHOLECYSTITIS, COMPLICATED DIVERTICULITIS, INCARCERATED HERNIA, PANCREATITIS, PELVIC INFLAMMATORY DISEASE, PERFORATED BOWEL or ULCER, ECTOPIC PREGNANCY, or TUBO-OVARIAN ABSCESS, OVARIAN TORSION, PERITONSILLAR ABSCESS, RETROPHARYNGEAL ABSCESS  thus I consider the discharge disposition reasonable. Also, there is no evidence or peritonitis, sepsis, or toxicity. Liza Marks and I have discussed the diagnosis and risks, and we agree with discharging home to follow-up with their primary doctor. We also discussed returning to the Emergency Department immediately if new or worsening symptoms occur. Clinical Impression:  1. Acute pharyngitis, unspecified etiology    2. Bacterial vaginosis    3. Possible exposure to STD    4. Diarrhea, unspecified type    5. Lower abdominal pain        Dispo:  Patient will be discharged  at this time. Patient was informed of this decision and agrees with plan. I have discussed lab and xray findings with patient and they understand. Questions were answered to the best of my ability. Discharge vitals:  Blood pressure (!) 142/72, pulse 88, temperature 98.5 °F (36.9 °C), temperature source Oral, resp. rate 18, height 5' 6\" (1.676 m), weight 232 lb (105.2 kg), SpO2 100 %, not currently breastfeeding. Prescriptions given:   Discharge Medication List as of 2/24/2022  3:21 PM      START taking these medications    Details   doxycycline hyclate (VIBRA-TABS) 100 MG tablet Take 1 tablet by mouth 2 times daily for 7 days, Disp-14 tablet, R-0Normal      metroNIDAZOLE (FLAGYL) 500 MG tablet Take 1 tablet by mouth 2 times daily for 7 days, Disp-14 tablet, R-0Normal               This chart was created using dragon voice recognition software.         Lilian Mendez MD  02/24/22 1599

## 2022-02-25 LAB
C TRACH DNA GENITAL QL NAA+PROBE: NEGATIVE
N. GONORRHOEAE DNA: NEGATIVE
SARS-COV-2: NOT DETECTED

## 2022-02-26 LAB — S PYO THROAT QL CULT: NORMAL

## 2022-02-27 LAB — GC CULTURE & SMEAR: NORMAL

## 2022-08-24 ENCOUNTER — HOSPITAL ENCOUNTER (EMERGENCY)
Age: 42
Discharge: HOME | End: 2022-08-24
Attending: EMERGENCY MEDICINE
Payer: MEDICAID

## 2022-08-24 ENCOUNTER — APPOINTMENT (OUTPATIENT)
Dept: GENERAL RADIOLOGY | Age: 42
End: 2022-08-24
Payer: MEDICAID

## 2022-08-24 VITALS
HEART RATE: 70 BPM | RESPIRATION RATE: 16 BRPM | SYSTOLIC BLOOD PRESSURE: 125 MMHG | TEMPERATURE: 98.5 F | HEIGHT: 65 IN | WEIGHT: 239.8 LBS | OXYGEN SATURATION: 100 % | BODY MASS INDEX: 39.95 KG/M2 | DIASTOLIC BLOOD PRESSURE: 85 MMHG

## 2022-08-24 DIAGNOSIS — R07.9 CHEST PAIN, UNSPECIFIED TYPE: Primary | ICD-10-CM

## 2022-08-24 DIAGNOSIS — M54.9 UPPER BACK PAIN ON LEFT SIDE: ICD-10-CM

## 2022-08-24 LAB
A/G RATIO: 1.4 (ref 1.1–2.2)
ALBUMIN SERPL-MCNC: 4.3 G/DL (ref 3.4–5)
ALP BLD-CCNC: 77 U/L (ref 40–129)
ALT SERPL-CCNC: 9 U/L (ref 10–40)
ANION GAP SERPL CALCULATED.3IONS-SCNC: 9 MMOL/L (ref 3–16)
AST SERPL-CCNC: 14 U/L (ref 15–37)
BASOPHILS ABSOLUTE: 0.1 K/UL (ref 0–0.2)
BASOPHILS RELATIVE PERCENT: 0.5 %
BILIRUB SERPL-MCNC: <0.2 MG/DL (ref 0–1)
BUN BLDV-MCNC: 10 MG/DL (ref 7–20)
CALCIUM SERPL-MCNC: 9.3 MG/DL (ref 8.3–10.6)
CHLORIDE BLD-SCNC: 104 MMOL/L (ref 99–110)
CO2: 26 MMOL/L (ref 21–32)
CREAT SERPL-MCNC: <0.5 MG/DL (ref 0.6–1.1)
D DIMER: 0.29 UG/ML FEU (ref 0–0.6)
EKG ATRIAL RATE: 76 BPM
EKG DIAGNOSIS: NORMAL
EKG P AXIS: 41 DEGREES
EKG P-R INTERVAL: 184 MS
EKG Q-T INTERVAL: 388 MS
EKG QRS DURATION: 92 MS
EKG QTC CALCULATION (BAZETT): 436 MS
EKG R AXIS: 47 DEGREES
EKG T AXIS: 53 DEGREES
EKG VENTRICULAR RATE: 76 BPM
EOSINOPHILS ABSOLUTE: 0.2 K/UL (ref 0–0.6)
EOSINOPHILS RELATIVE PERCENT: 2.2 %
GFR AFRICAN AMERICAN: >60
GFR NON-AFRICAN AMERICAN: >60
GLUCOSE BLD-MCNC: 105 MG/DL (ref 70–99)
HCT VFR BLD CALC: 40 % (ref 36–48)
HEMOGLOBIN: 13.9 G/DL (ref 12–16)
LYMPHOCYTES ABSOLUTE: 1.9 K/UL (ref 1–5.1)
LYMPHOCYTES RELATIVE PERCENT: 18.4 %
MCH RBC QN AUTO: 30.9 PG (ref 26–34)
MCHC RBC AUTO-ENTMCNC: 34.8 G/DL (ref 31–36)
MCV RBC AUTO: 88.8 FL (ref 80–100)
MONOCYTES ABSOLUTE: 0.6 K/UL (ref 0–1.3)
MONOCYTES RELATIVE PERCENT: 6.1 %
NEUTROPHILS ABSOLUTE: 7.5 K/UL (ref 1.7–7.7)
NEUTROPHILS RELATIVE PERCENT: 72.8 %
PDW BLD-RTO: 13.2 % (ref 12.4–15.4)
PLATELET # BLD: 322 K/UL (ref 135–450)
PMV BLD AUTO: 6.9 FL (ref 5–10.5)
POTASSIUM SERPL-SCNC: 4.4 MMOL/L (ref 3.5–5.1)
RBC # BLD: 4.51 M/UL (ref 4–5.2)
SODIUM BLD-SCNC: 139 MMOL/L (ref 136–145)
TOTAL PROTEIN: 7.4 G/DL (ref 6.4–8.2)
TROPONIN: <0.01 NG/ML
WBC # BLD: 10.3 K/UL (ref 4–11)

## 2022-08-24 PROCEDURE — 85379 FIBRIN DEGRADATION QUANT: CPT

## 2022-08-24 PROCEDURE — 6370000000 HC RX 637 (ALT 250 FOR IP): Performed by: EMERGENCY MEDICINE

## 2022-08-24 PROCEDURE — 71045 X-RAY EXAM CHEST 1 VIEW: CPT

## 2022-08-24 PROCEDURE — 80053 COMPREHEN METABOLIC PANEL: CPT

## 2022-08-24 PROCEDURE — 99285 EMERGENCY DEPT VISIT HI MDM: CPT

## 2022-08-24 PROCEDURE — 84484 ASSAY OF TROPONIN QUANT: CPT

## 2022-08-24 PROCEDURE — 96375 TX/PRO/DX INJ NEW DRUG ADDON: CPT

## 2022-08-24 PROCEDURE — 85025 COMPLETE CBC W/AUTO DIFF WBC: CPT

## 2022-08-24 PROCEDURE — 6360000002 HC RX W HCPCS: Performed by: EMERGENCY MEDICINE

## 2022-08-24 PROCEDURE — 96374 THER/PROPH/DIAG INJ IV PUSH: CPT

## 2022-08-24 PROCEDURE — 93010 ELECTROCARDIOGRAM REPORT: CPT | Performed by: INTERNAL MEDICINE

## 2022-08-24 PROCEDURE — 93005 ELECTROCARDIOGRAM TRACING: CPT | Performed by: EMERGENCY MEDICINE

## 2022-08-24 RX ORDER — METHOCARBAMOL 750 MG/1
750 TABLET, FILM COATED ORAL 3 TIMES DAILY PRN
Qty: 15 TABLET | Refills: 0 | Status: SHIPPED | OUTPATIENT
Start: 2022-08-24 | End: 2022-08-29

## 2022-08-24 RX ORDER — KETOROLAC TROMETHAMINE 30 MG/ML
30 INJECTION, SOLUTION INTRAMUSCULAR; INTRAVENOUS ONCE
Status: COMPLETED | OUTPATIENT
Start: 2022-08-24 | End: 2022-08-24

## 2022-08-24 RX ORDER — HYDROCODONE BITARTRATE AND ACETAMINOPHEN 5; 325 MG/1; MG/1
1 TABLET ORAL ONCE
Status: COMPLETED | OUTPATIENT
Start: 2022-08-24 | End: 2022-08-24

## 2022-08-24 RX ORDER — ORPHENADRINE CITRATE 30 MG/ML
60 INJECTION INTRAMUSCULAR; INTRAVENOUS ONCE
Status: COMPLETED | OUTPATIENT
Start: 2022-08-24 | End: 2022-08-24

## 2022-08-24 RX ORDER — IBUPROFEN 800 MG/1
800 TABLET ORAL EVERY 8 HOURS PRN
Qty: 20 TABLET | Refills: 0 | Status: SHIPPED | OUTPATIENT
Start: 2022-08-24 | End: 2022-09-03

## 2022-08-24 RX ORDER — AMMONIA INHALANTS 0.04 G/.3ML
INHALANT RESPIRATORY (INHALATION)
Status: DISCONTINUED
Start: 2022-08-24 | End: 2022-08-24 | Stop reason: HOSPADM

## 2022-08-24 RX ORDER — LIDOCAINE 4 G/G
1 PATCH TOPICAL DAILY
Status: DISCONTINUED | OUTPATIENT
Start: 2022-08-24 | End: 2022-08-24 | Stop reason: HOSPADM

## 2022-08-24 RX ORDER — LIDOCAINE 50 MG/G
1 PATCH TOPICAL DAILY
Qty: 10 PATCH | Refills: 0 | Status: SHIPPED | OUTPATIENT
Start: 2022-08-24 | End: 2022-09-03

## 2022-08-24 RX ADMIN — ORPHENADRINE CITRATE 60 MG: 30 INJECTION INTRAMUSCULAR; INTRAVENOUS at 07:18

## 2022-08-24 RX ADMIN — HYDROCODONE BITARTRATE AND ACETAMINOPHEN 1 TABLET: 5; 325 TABLET ORAL at 08:41

## 2022-08-24 RX ADMIN — KETOROLAC TROMETHAMINE 30 MG: 30 INJECTION, SOLUTION INTRAMUSCULAR at 07:18

## 2022-08-24 ASSESSMENT — PAIN DESCRIPTION - DESCRIPTORS: DESCRIPTORS: DISCOMFORT

## 2022-08-24 ASSESSMENT — PAIN SCALES - GENERAL: PAINLEVEL_OUTOF10: 10

## 2022-08-24 ASSESSMENT — PAIN - FUNCTIONAL ASSESSMENT
PAIN_FUNCTIONAL_ASSESSMENT: NONE - DENIES PAIN
PAIN_FUNCTIONAL_ASSESSMENT: 0-10

## 2022-08-24 ASSESSMENT — HEART SCORE: ECG: 1

## 2022-08-24 ASSESSMENT — PAIN DESCRIPTION - ORIENTATION: ORIENTATION: MID;UPPER

## 2022-08-24 ASSESSMENT — PAIN DESCRIPTION - LOCATION: LOCATION: BACK;CHEST

## 2022-08-24 NOTE — Clinical Note
Won Kee was seen and treated in our emergency department on 8/24/2022. She may return to work on 08/25/2022. If you have any questions or concerns, please don't hesitate to call.       Blanche Covington MD

## 2022-08-24 NOTE — DISCHARGE INSTRUCTIONS
Return for fever, increased chest pain, shortness of breath, dizziness or fainting, numbness or weakness.

## 2022-08-24 NOTE — ED NOTES
Patient given prescription, work note, discharge instructions verbal and written, patient verbalized understanding. Alert/oriented X4, Clear speech.   Patient exhibits no distress, ambulates with steady gait per self leaving unit, no further request.      Duke Merritt RN  08/24/22 5799 no

## 2022-08-24 NOTE — ED PROVIDER NOTES
[facility]  EMERGENCY DEPT VISIT      Patient Identification  Susana Sheridan is a 37 y.o. female. Chief Complaint   Chest Pain (C/o Mid Back Pain that is now radiating into Mid CP; Started yesterday, denies any injury. No Sob but feels like she's \"going to pass out\". Denies recent illness; No medications. ) and Back Pain      History of Present Illness: This is a  37 y.o. female who presents ambulatory  to the ED with complaints of chest and upper back pain. Onset yesterday and has gotten progressively worse. Hurts to move or take deep breaths. No injury. No fever. No sinus congestion, sore throat, cough. Feels a little short of breath but mostly from the pain. No nausea or vomiting. Occasionally she gets some tingling in her chest and arm. No weakness. Took aspirin with no relief. No h/o HTN, high cholesterol, DM. She does smoke. No estrogens. No travel or immobilization. No h/o DVT or PE. Left knee has been bothering her and sometimes feels like it will buckle. Pain is worse on left side of chest and back.      Past Medical History:   Diagnosis Date    Gonorrhea     past.       Past Surgical History:   Procedure Laterality Date    HYSTERECTOMY (CERVIX STATUS UNKNOWN)           Current Facility-Administered Medications:     ammonia inhaler, , , ,     HYDROcodone-acetaminophen (NORCO) 5-325 MG per tablet 1 tablet, 1 tablet, Oral, Once, Velma Coombs MD    lidocaine 4 % external patch 1 patch, 1 patch, TransDERmal, Daily, Velma Coombs MD    Current Outpatient Medications:     ibuprofen (IBU) 800 MG tablet, Take 1 tablet by mouth every 8 hours as needed for Pain, Disp: 20 tablet, Rfl: 0    methocarbamol (ROBAXIN-750) 750 MG tablet, Take 1 tablet by mouth 3 times daily as needed (muscle spasms), Disp: 15 tablet, Rfl: 0    lidocaine (LIDODERM) 5 %, Place 1 patch onto the skin daily for 10 days 12 hours on, 12 hours off., Disp: 10 patch, Rfl: 0    albuterol sulfate HFA (VENTOLIN HFA) 108 (90 Base) MCG/ACT inhaler, Inhale 2 puffs into the lungs 4 times daily as needed for Wheezing (Patient not taking: Reported on 8/24/2022), Disp: 1 Inhaler, Rfl: 0    No Known Allergies    Social History     Socioeconomic History    Marital status: Single     Spouse name: Not on file    Number of children: Not on file    Years of education: Not on file    Highest education level: Not on file   Occupational History    Not on file   Tobacco Use    Smoking status: Every Day     Packs/day: 0.50     Types: Cigarettes    Smokeless tobacco: Never   Substance and Sexual Activity    Alcohol use: No    Drug use: No    Sexual activity: Not on file   Other Topics Concern    Not on file   Social History Narrative    Not on file     Social Determinants of Health     Financial Resource Strain: Not on file   Food Insecurity: Not on file   Transportation Needs: Not on file   Physical Activity: Not on file   Stress: Not on file   Social Connections: Not on file   Intimate Partner Violence: Not on file   Housing Stability: Not on file       Nursing Notes Reviewed      ROS:  GENERAL:  No fever, no chills, no diaphoresis, no appetite changes  EYES: no eye discharge, no eye redness, no visual changes  ENT: no nasal congestion, no sore throat  CARDIAC: + chest pain, no palpitations, no leg swelling  PULM: no cough, no shortness of breath  ABD: no abdominal pain, no nausea, no vomiting, no diarrhea  : no dysuria, no hematuria, no urgency, no frequency. No flank pain  MUSCULOSKELETAL: + back pain, no arthralgias, + myalgias  NEURO: no headache, + lightheadedness, no dizziness, no numbness, no weakness, no syncope, no confusion, no speech difficulty  SKIN: no rashes, no erythema, no wounds, no ecchymosis      PHYSICAL EXAM:  GENERAL APPEARANCE: Leon Traore is in no acute respiratory distress. Awake and alert.   VITAL SIGNS:   ED Triage Vitals [08/24/22 0630]   Enc Vitals Group      /85      Heart Rate 70      Resp 16      Temp 98.5 °F (36.9 °C) Temp Source Oral      SpO2 100 %      Weight 239 lb 12.8 oz (108.8 kg)      Height 5' 5\" (1.651 m)      Head Circumference       Peak Flow       Pain Score       Pain Loc       Pain Edu? Excl. in [address]? HEAD: Normocephalic, atraumatic. EYES:  Extraocular muscles are intact. Pupils equal round and reactive to light. Conjunctivas are pink. Negative scleral icterus. ENT:  Mucous membranes are moist.  Pharynx without erythema or exudates. NECK: Nontender and supple. No cervical adenopathy. CHEST:  Clear to auscultation bilaterally. No rales, rhonchi, or wheezing. Chest wall is tender  HEART:  Regular rate and regular rhythm. No murmurs. Strong and equal pulses in the upper and lower extremities. ABDOMEN: Soft,  nondistended, positive bowel sounds. abdomen is nontender. No rebound. no guarding. MUSCULOSKELETAL: The calves are nontender to palpation. Active range of motion of the upper and lower extremities. No edema. Left scapular region is tender  NEUROLOGICAL: Awake, alert and oriented x 3. Power intact in the upper and lower extremities. Sensation is intact to light touch in the upper and lower extremities. Cranial Nerves 2-12 are intact. No truncal ataxia. No dysarthria or aphasia. DERMATOLOGIC: No petechiae, rashes, or ecchymoses. No erythema. PSYCH: normal mood and affect. Normal thought content. ED COURSE AND MEDICAL DECISION MAKING:    EKG as interpreted by myself:  normal sinus rhythm with a rate of 76  Axis is   Normal  QTc is  normal  Intervals and Durations are unremarkable. No specific ST-T wave changes appreciated. No evidence of acute ischemia. Compared to prior EKG dated  8/5/21, nonspecific T changes no longer present today    Radiology:  Films have been read by radiologist as noted in chart unless otherwise stated. Other radiologic studies (i.e. CT, MRI, ultrasounds, etc ) have been interpreted by radiologist.     XR CHEST PORTABLE   Final Result      1.  Negative undeterminedAbnormal ECGConfirmed by [facility] - JAIME CALDWELL MD ( on 8/24/2022 8:13:56 AM       Treatment in the department:  Patient received the following while in the ED. Medications   ammonia inhaler (has no administration in time range)   HYDROcodone-acetaminophen (NORCO) 5-325 MG per tablet 1 tablet (has no administration in time range)   lidocaine 4 % external patch 1 patch (has no administration in time range)   ketorolac (TORADOL) injection 30 mg (30 mg IntraVENous Given 8/24/22 0718)   orphenadrine (NORFLEX) injection 60 mg (60 mg IntraVENous Given 8/24/22 0718)         Repeat exam at  [phone] shows pain still present but can move around better with less pain    Medical decision making:  Patient presents with constant chest and upper back pain for 2 days. Worse with movement and deep breaths. Palpable tenderness to chest and back. Nonischemic EKG. Negative troponin with 24 hours of pain constant. Therefore did not feel 3 hour troponin necessary. No hypoxia. Low risk for PE and negative ddimer. Likely this pain is musculoskeletal.    SCREENINGS:   [place] Coma Scale  Eye Opening: Spontaneous  Best Verbal Response: Oriented  Best Motor Response: Obeys commands  Herson Coma Scale Score: 15 Heart Score for chest pain patients  History: Slightly Suspicious  ECG: Non-Specifc repolarization disturbance/LBTB/PM  Patient Age: < 45 years  *Risk factors for Atherosclerotic disease: Cigarette smoking  Risk Factors: 1 or 2 risk factors  Troponin: < 1X normal limit  Heart Score Total: 2      I estimate there is LOW risk for PULMONARY EMBOLISM, ACUTE CORONARY SYNDROME, OR THORACIC AORTIC DISSECTION, PERICARDITIS, PNEUMONIA, PNEUMOTHORAX, thus I consider the discharge disposition reasonable. Tequila Armendariz and I have discussed the diagnosis and risks, and we agree with discharging home to follow-up with their primary doctor.  We also discussed returning to the Emergency Department immediately if new or worsening symptoms occur.    HEART Score ? 3 and 1 negative troponin - No hospitalization indicated    I completed a HEART Score to screen for Major Adverse Cardiac Event (MACE) in this patient. The evidence indicates that the patient is very low risk for MACE and this is consistent with my clinical intuition. I have discussed with the patient my clinical impression and the result of the HEART Score to screen for MACE, as well as the risks of further testing and hospitalization. The HEART Score shows that the risk for MACE is less than 2%. The risk of further workup or hospitalization for MACE is likely higher than the risk of the patient having a MACE. It is, therefore, in the patients best interest not to do additional emergent testing or to be hospitalized for MACE. Although the risk of MACE has not been eliminated, the risks of further testing or hospitalization for MACE likely exceed the benefit, and the patient declines further emergent evaluation or hospitalization for MACE. Clinical Impression:  1. Chest pain, unspecified type    2. Upper back pain on left side        Dispo:  Patient will be discharged at this time. Patient was informed of this decision and agrees with plan. I have discussed lab and xray findings with patient and they understand. Questions were answered to the best of my ability. Followup plan:  [facility] [id number]  [phone]    Schedule an appointment as soon as possible for a visit       Discharge vitals:  Blood pressure 125/85, pulse 70, temperature 98.5 °F (36.9 °C), temperature source Oral, resp. rate 16, height 5' 5\" (1.651 m), weight 239 lb 12.8 oz (108.8 kg), SpO2 100 %, not currently breastfeeding.     Prescriptions given:   New Prescriptions    IBUPROFEN (IBU) 800 MG TABLET    Take 1 tablet by mouth every 8 hours as needed for Pain    LIDOCAINE (LIDODERM) 5 %    Place 1 patch onto the skin daily for 10 days 12 hours on, 12 hours off. METHOCARBAMOL (ROBAXIN-750) 750 MG TABLET    Take 1 tablet by mouth 3 times daily as needed (muscle spasms)       This chart was created using Dragon voice recognition software.         Nisreen Hein MD  08/24/22 3949

## 2022-08-24 NOTE — Clinical Note
Kyleighrobbie Dumont accompanied Isabel Hampton to the emergency department on 8/24/2022. They may return to work on 08/25/2022. If you have any questions or concerns, please don't hesitate to call.       Natalie Calvo MD

## 2023-08-15 ENCOUNTER — APPOINTMENT (OUTPATIENT)
Dept: GENERAL RADIOLOGY | Age: 43
End: 2023-08-15
Payer: MEDICAID

## 2023-08-15 ENCOUNTER — HOSPITAL ENCOUNTER (EMERGENCY)
Age: 43
Discharge: HOME | End: 2023-08-15
Attending: EMERGENCY MEDICINE
Payer: MEDICAID

## 2023-08-15 VITALS
DIASTOLIC BLOOD PRESSURE: 87 MMHG | SYSTOLIC BLOOD PRESSURE: 136 MMHG | RESPIRATION RATE: 22 BRPM | HEIGHT: 65 IN | TEMPERATURE: 99 F | BODY MASS INDEX: 39.22 KG/M2 | HEART RATE: 92 BPM | OXYGEN SATURATION: 99 % | WEIGHT: 235.44 LBS

## 2023-08-15 DIAGNOSIS — E83.42 HYPOMAGNESEMIA: ICD-10-CM

## 2023-08-15 DIAGNOSIS — J06.9 ACUTE UPPER RESPIRATORY INFECTION: Primary | ICD-10-CM

## 2023-08-15 DIAGNOSIS — L72.3 SEBACEOUS CYST: ICD-10-CM

## 2023-08-15 LAB
ALBUMIN SERPL-MCNC: 4.7 G/DL (ref 3.4–5)
ALBUMIN/GLOB SERPL: 1.5 {RATIO} (ref 1.1–2.2)
ALP SERPL-CCNC: 81 U/L (ref 40–129)
ALT SERPL-CCNC: 13 U/L (ref 10–40)
ANION GAP SERPL CALCULATED.3IONS-SCNC: 13 MMOL/L (ref 3–16)
AST SERPL-CCNC: 14 U/L (ref 15–37)
BACTERIA URNS QL MICRO: ABNORMAL /HPF
BASOPHILS # BLD: 0 K/UL (ref 0–0.2)
BASOPHILS NFR BLD: 0.2 %
BILIRUB SERPL-MCNC: 0.5 MG/DL (ref 0–1)
BILIRUB UR QL STRIP.AUTO: ABNORMAL
BUN SERPL-MCNC: 5 MG/DL (ref 7–20)
CALCIUM SERPL-MCNC: 9.7 MG/DL (ref 8.3–10.6)
CHLORIDE SERPL-SCNC: 104 MMOL/L (ref 99–110)
CLARITY UR: CLEAR
CO2 SERPL-SCNC: 23 MMOL/L (ref 21–32)
COLOR UR: YELLOW
CREAT SERPL-MCNC: <0.5 MG/DL (ref 0.6–1.1)
DEPRECATED RDW RBC AUTO: 13.4 % (ref 12.4–15.4)
EOSINOPHIL # BLD: 0.2 K/UL (ref 0–0.6)
EOSINOPHIL NFR BLD: 1 %
EPI CELLS #/AREA URNS HPF: ABNORMAL /HPF (ref 0–5)
FLUAV RNA UPPER RESP QL NAA+PROBE: NEGATIVE
FLUBV AG NPH QL: NEGATIVE
GFR SERPLBLD CREATININE-BSD FMLA CKD-EPI: >60 ML/MIN/{1.73_M2}
GLUCOSE SERPL-MCNC: 106 MG/DL (ref 70–99)
GLUCOSE UR STRIP.AUTO-MCNC: NEGATIVE MG/DL
HCT VFR BLD AUTO: 41.5 % (ref 36–48)
HGB BLD-MCNC: 14 G/DL (ref 12–16)
HGB UR QL STRIP.AUTO: ABNORMAL
KETONES UR STRIP.AUTO-MCNC: ABNORMAL MG/DL
LEUKOCYTE ESTERASE UR QL STRIP.AUTO: ABNORMAL
LYMPHOCYTES # BLD: 1.2 K/UL (ref 1–5.1)
LYMPHOCYTES NFR BLD: 6.9 %
MAGNESIUM SERPL-MCNC: 1.5 MG/DL (ref 1.8–2.4)
MCH RBC QN AUTO: 29.8 PG (ref 26–34)
MCHC RBC AUTO-ENTMCNC: 33.7 G/DL (ref 31–36)
MCV RBC AUTO: 88.5 FL (ref 80–100)
MONOCYTES # BLD: 0.8 K/UL (ref 0–1.3)
MONOCYTES NFR BLD: 4.5 %
MUCOUS THREADS #/AREA URNS LPF: ABNORMAL /LPF
NEUTROPHILS # BLD: 15.4 K/UL (ref 1.7–7.7)
NEUTROPHILS NFR BLD: 87.4 %
NITRITE UR QL STRIP.AUTO: NEGATIVE
PH UR STRIP.AUTO: 5.5 [PH] (ref 5–8)
PLATELET # BLD AUTO: 360 K/UL (ref 135–450)
PMV BLD AUTO: 6.9 FL (ref 5–10.5)
POTASSIUM SERPL-SCNC: 3.4 MMOL/L (ref 3.5–5.1)
PROT SERPL-MCNC: 7.8 G/DL (ref 6.4–8.2)
PROT UR STRIP.AUTO-MCNC: 100 MG/DL
RBC # BLD AUTO: 4.69 M/UL (ref 4–5.2)
RBC #/AREA URNS HPF: ABNORMAL /HPF (ref 0–4)
SARS-COV-2 RDRP RESP QL NAA+PROBE: NOT DETECTED
SODIUM SERPL-SCNC: 140 MMOL/L (ref 136–145)
SP GR UR STRIP.AUTO: >=1.03 (ref 1–1.03)
UA DIPSTICK W REFLEX MICRO PNL UR: YES
URN SPEC COLLECT METH UR: ABNORMAL
UROBILINOGEN UR STRIP-ACNC: 1 E.U./DL
WBC # BLD AUTO: 17.6 K/UL (ref 4–11)
WBC #/AREA URNS HPF: ABNORMAL /HPF (ref 0–5)

## 2023-08-15 PROCEDURE — 85025 COMPLETE CBC W/AUTO DIFF WBC: CPT

## 2023-08-15 PROCEDURE — 6370000000 HC RX 637 (ALT 250 FOR IP): Performed by: EMERGENCY MEDICINE

## 2023-08-15 PROCEDURE — 81001 URINALYSIS AUTO W/SCOPE: CPT

## 2023-08-15 PROCEDURE — 96374 THER/PROPH/DIAG INJ IV PUSH: CPT

## 2023-08-15 PROCEDURE — 80053 COMPREHEN METABOLIC PANEL: CPT

## 2023-08-15 PROCEDURE — 87804 INFLUENZA ASSAY W/OPTIC: CPT

## 2023-08-15 PROCEDURE — 83735 ASSAY OF MAGNESIUM: CPT

## 2023-08-15 PROCEDURE — 71046 X-RAY EXAM CHEST 2 VIEWS: CPT

## 2023-08-15 PROCEDURE — 6360000002 HC RX W HCPCS: Performed by: EMERGENCY MEDICINE

## 2023-08-15 PROCEDURE — 87635 SARS-COV-2 COVID-19 AMP PRB: CPT

## 2023-08-15 PROCEDURE — 2580000003 HC RX 258: Performed by: EMERGENCY MEDICINE

## 2023-08-15 PROCEDURE — 99284 EMERGENCY DEPT VISIT MOD MDM: CPT

## 2023-08-15 RX ORDER — KETOROLAC TROMETHAMINE 30 MG/ML
30 INJECTION, SOLUTION INTRAMUSCULAR; INTRAVENOUS ONCE
Status: COMPLETED | OUTPATIENT
Start: 2023-08-15 | End: 2023-08-15

## 2023-08-15 RX ORDER — BENZONATATE 100 MG/1
100 CAPSULE ORAL 3 TIMES DAILY PRN
Qty: 30 CAPSULE | Refills: 0 | Status: SHIPPED | OUTPATIENT
Start: 2023-08-15 | End: 2023-08-25

## 2023-08-15 RX ORDER — 0.9 % SODIUM CHLORIDE 0.9 %
500 INTRAVENOUS SOLUTION INTRAVENOUS ONCE
Status: COMPLETED | OUTPATIENT
Start: 2023-08-15 | End: 2023-08-15

## 2023-08-15 RX ORDER — MAGNESIUM OXIDE 400 MG/1
400 TABLET ORAL DAILY
Qty: 7 TABLET | Refills: 0 | Status: SHIPPED | OUTPATIENT
Start: 2023-08-15

## 2023-08-15 RX ORDER — ACETAMINOPHEN 500 MG
500 TABLET ORAL 4 TIMES DAILY PRN
Qty: 120 TABLET | Refills: 0 | Status: SHIPPED | OUTPATIENT
Start: 2023-08-15

## 2023-08-15 RX ORDER — ACETAMINOPHEN 500 MG
1000 TABLET ORAL ONCE
Status: COMPLETED | OUTPATIENT
Start: 2023-08-15 | End: 2023-08-15

## 2023-08-15 RX ORDER — GUAIFENESIN 600 MG/1
600 TABLET, EXTENDED RELEASE ORAL 2 TIMES DAILY
Qty: 30 TABLET | Refills: 0 | Status: SHIPPED | OUTPATIENT
Start: 2023-08-15 | End: 2023-08-30

## 2023-08-15 RX ORDER — IBUPROFEN 800 MG/1
800 TABLET ORAL ONCE
Status: DISCONTINUED | OUTPATIENT
Start: 2023-08-15 | End: 2023-08-15

## 2023-08-15 RX ORDER — ONDANSETRON 4 MG/1
4 TABLET, FILM COATED ORAL 3 TIMES DAILY PRN
Qty: 15 TABLET | Refills: 0 | Status: SHIPPED | OUTPATIENT
Start: 2023-08-15

## 2023-08-15 RX ORDER — IBUPROFEN 600 MG/1
600 TABLET ORAL EVERY 6 HOURS PRN
Qty: 40 TABLET | Refills: 0 | Status: SHIPPED | OUTPATIENT
Start: 2023-08-15

## 2023-08-15 RX ADMIN — KETOROLAC TROMETHAMINE 30 MG: 30 INJECTION, SOLUTION INTRAMUSCULAR; INTRAVENOUS at 17:20

## 2023-08-15 RX ADMIN — SODIUM CHLORIDE 500 ML: 9 INJECTION, SOLUTION INTRAVENOUS at 17:20

## 2023-08-15 RX ADMIN — ACETAMINOPHEN 1000 MG: 500 TABLET ORAL at 18:15

## 2023-08-15 ASSESSMENT — PAIN - FUNCTIONAL ASSESSMENT: PAIN_FUNCTIONAL_ASSESSMENT: 0-10

## 2023-08-15 ASSESSMENT — PAIN SCALES - GENERAL: PAINLEVEL_OUTOF10: 10

## 2023-08-15 ASSESSMENT — PAIN DESCRIPTION - LOCATION: LOCATION: GENERALIZED

## 2023-08-15 NOTE — ED PROVIDER NOTES
[address]            Pt Name: Garrick Arechiga   MRN: [id number]   [address] [facility] 1980   Date of evaluation: 8/15/2023   Provider: Demetrius Keen DO   PCP: Referring Not In System (Inactive)   Note Started: 5:57 PM EDT 8/15/23          CHIEF COMPLAINT     Chief Complaint   Patient presents with    URI     Sore throat, headache, cough, generalized aches, and an \"insect bite\" to posterior neck. Symptoms began last night. Pt is crying loudly during triage. Hard to console. HISTORY OF PRESENT ILLNESS:   History from : Patient   Limitations to history : None     Garrick Arechiga is a 40 y.o. female who presents to the emergency department with diffuse complaints. Patient crying during triage and assessment. She has noted upper respiratory symptoms with cough and congestion as well as headache, body aches, nausea and diarrhea. Patient reports sweats and chills but is afebrile upon arrival.  She is tachycardic upon arrival but assessment occurred while crying. Nursing Notes were all reviewed and agreed with, or any disagreements were addressed in the HPI. REVIEW OF SYSTEMS :    Positives and Pertinent negatives as per HPI. MEDICAL HISTORY   has a past medical history of Gonorrhea.     Past Surgical History:   Procedure Laterality Date    HYSTERECTOMY (CERVIX STATUS UNKNOWN)        CURRENTMEDICATIONS       Previous Medications    ALBUTEROL SULFATE HFA (VENTOLIN HFA) 108 (90 BASE) MCG/ACT INHALER    Inhale 2 puffs into the lungs 4 times daily as needed for Wheezing    IBUPROFEN (IBU) 800 MG TABLET    Take 1 tablet by mouth every 8 hours as needed for Pain      SCREENINGS          Herson Coma Scale  Eye Opening: Spontaneous  Best Verbal Response: Oriented  Best Motor Response: Obeys commands  Herson Coma Scale Score: 15                CIWA Assessment  BP: 136/87  Pulse: (!) 112                  PHYSICAL EXAM :  ED Triage Vitals

## 2023-08-17 ENCOUNTER — HOSPITAL ENCOUNTER (EMERGENCY)
Age: 43
Discharge: HOME | End: 2023-08-17
Attending: EMERGENCY MEDICINE
Payer: MEDICAID

## 2023-08-17 VITALS
SYSTOLIC BLOOD PRESSURE: 153 MMHG | BODY MASS INDEX: 39.09 KG/M2 | HEIGHT: 65 IN | WEIGHT: 234.6 LBS | TEMPERATURE: 98.1 F | OXYGEN SATURATION: 99 % | HEART RATE: 57 BPM | DIASTOLIC BLOOD PRESSURE: 100 MMHG | RESPIRATION RATE: 20 BRPM

## 2023-08-17 DIAGNOSIS — R06.2 WHEEZING: ICD-10-CM

## 2023-08-17 DIAGNOSIS — L03.811 CELLULITIS OF HEAD EXCEPT FACE: Primary | ICD-10-CM

## 2023-08-17 LAB
ALBUMIN SERPL-MCNC: 4.1 G/DL (ref 3.4–5)
ALBUMIN/GLOB SERPL: 1.2 {RATIO} (ref 1.1–2.2)
ALP SERPL-CCNC: 73 U/L (ref 40–129)
ALT SERPL-CCNC: 13 U/L (ref 10–40)
ANION GAP SERPL CALCULATED.3IONS-SCNC: 13 MMOL/L (ref 3–16)
AST SERPL-CCNC: 15 U/L (ref 15–37)
BASOPHILS # BLD: 0 K/UL (ref 0–0.2)
BASOPHILS NFR BLD: 0.3 %
BILIRUB SERPL-MCNC: 0.4 MG/DL (ref 0–1)
BUN SERPL-MCNC: 5 MG/DL (ref 7–20)
CALCIUM SERPL-MCNC: 9.2 MG/DL (ref 8.3–10.6)
CHLORIDE SERPL-SCNC: 103 MMOL/L (ref 99–110)
CO2 SERPL-SCNC: 24 MMOL/L (ref 21–32)
CREAT SERPL-MCNC: <0.5 MG/DL (ref 0.6–1.1)
DEPRECATED RDW RBC AUTO: 13.1 % (ref 12.4–15.4)
EOSINOPHIL # BLD: 0.2 K/UL (ref 0–0.6)
EOSINOPHIL NFR BLD: 2.2 %
GFR SERPLBLD CREATININE-BSD FMLA CKD-EPI: >60 ML/MIN/{1.73_M2}
GLUCOSE SERPL-MCNC: 104 MG/DL (ref 70–99)
HCT VFR BLD AUTO: 39.8 % (ref 36–48)
HGB BLD-MCNC: 13.9 G/DL (ref 12–16)
LIPASE SERPL-CCNC: 25 U/L (ref 13–60)
LYMPHOCYTES # BLD: 1.5 K/UL (ref 1–5.1)
LYMPHOCYTES NFR BLD: 17.5 %
MCH RBC QN AUTO: 31.1 PG (ref 26–34)
MCHC RBC AUTO-ENTMCNC: 35 G/DL (ref 31–36)
MCV RBC AUTO: 88.8 FL (ref 80–100)
MONOCYTES # BLD: 0.8 K/UL (ref 0–1.3)
MONOCYTES NFR BLD: 9.5 %
NEUTROPHILS # BLD: 5.9 K/UL (ref 1.7–7.7)
NEUTROPHILS NFR BLD: 70.5 %
PLATELET # BLD AUTO: 298 K/UL (ref 135–450)
PMV BLD AUTO: 6.9 FL (ref 5–10.5)
POTASSIUM SERPL-SCNC: 3.7 MMOL/L (ref 3.5–5.1)
PROT SERPL-MCNC: 7.5 G/DL (ref 6.4–8.2)
RBC # BLD AUTO: 4.48 M/UL (ref 4–5.2)
SARS-COV-2 RDRP RESP QL NAA+PROBE: NOT DETECTED
SODIUM SERPL-SCNC: 140 MMOL/L (ref 136–145)
WBC # BLD AUTO: 8.4 K/UL (ref 4–11)

## 2023-08-17 PROCEDURE — 94640 AIRWAY INHALATION TREATMENT: CPT

## 2023-08-17 PROCEDURE — 87635 SARS-COV-2 COVID-19 AMP PRB: CPT

## 2023-08-17 PROCEDURE — 80053 COMPREHEN METABOLIC PANEL: CPT

## 2023-08-17 PROCEDURE — 6360000002 HC RX W HCPCS: Performed by: EMERGENCY MEDICINE

## 2023-08-17 PROCEDURE — 2500000003 HC RX 250 WO HCPCS: Performed by: EMERGENCY MEDICINE

## 2023-08-17 PROCEDURE — 83690 ASSAY OF LIPASE: CPT

## 2023-08-17 PROCEDURE — 10060 I&D ABSCESS SIMPLE/SINGLE: CPT

## 2023-08-17 PROCEDURE — 6370000000 HC RX 637 (ALT 250 FOR IP): Performed by: EMERGENCY MEDICINE

## 2023-08-17 PROCEDURE — 99283 EMERGENCY DEPT VISIT LOW MDM: CPT

## 2023-08-17 PROCEDURE — 85025 COMPLETE CBC W/AUTO DIFF WBC: CPT

## 2023-08-17 RX ORDER — DOXYCYCLINE HYCLATE 100 MG
100 TABLET ORAL 2 TIMES DAILY
Qty: 14 TABLET | Refills: 0 | Status: SHIPPED | OUTPATIENT
Start: 2023-08-17 | End: 2023-08-24

## 2023-08-17 RX ORDER — AMOXICILLIN 875 MG/1
875 TABLET, COATED ORAL 2 TIMES DAILY
Qty: 14 TABLET | Refills: 0 | Status: SHIPPED | OUTPATIENT
Start: 2023-08-17 | End: 2023-08-24

## 2023-08-17 RX ORDER — ACETAMINOPHEN 500 MG
1000 TABLET ORAL ONCE
Status: COMPLETED | OUTPATIENT
Start: 2023-08-17 | End: 2023-08-17

## 2023-08-17 RX ORDER — ALBUTEROL SULFATE 2.5 MG/3ML
2.5 SOLUTION RESPIRATORY (INHALATION) ONCE
Status: COMPLETED | OUTPATIENT
Start: 2023-08-17 | End: 2023-08-17

## 2023-08-17 RX ORDER — ALBUTEROL SULFATE 90 UG/1
2 AEROSOL, METERED RESPIRATORY (INHALATION) 4 TIMES DAILY PRN
Qty: 18 G | Refills: 0 | Status: SHIPPED | OUTPATIENT
Start: 2023-08-17

## 2023-08-17 RX ADMIN — LIDOCAINE HYDROCHLORIDE 20 ML: 10; .005 INJECTION, SOLUTION EPIDURAL; INFILTRATION; INTRACAUDAL; PERINEURAL at 10:38

## 2023-08-17 RX ADMIN — ALBUTEROL SULFATE 2.5 MG: 2.5 SOLUTION RESPIRATORY (INHALATION) at 10:29

## 2023-08-17 RX ADMIN — ACETAMINOPHEN 1000 MG: 500 TABLET ORAL at 11:38

## 2023-08-17 ASSESSMENT — LIFESTYLE VARIABLES
HOW OFTEN DO YOU HAVE A DRINK CONTAINING ALCOHOL: NEVER
HOW MANY STANDARD DRINKS CONTAINING ALCOHOL DO YOU HAVE ON A TYPICAL DAY: PATIENT DOES NOT DRINK

## 2023-08-17 ASSESSMENT — PAIN SCALES - GENERAL: PAINLEVEL_OUTOF10: 9

## 2023-08-17 ASSESSMENT — PAIN - FUNCTIONAL ASSESSMENT
PAIN_FUNCTIONAL_ASSESSMENT: ACTIVITIES ARE NOT PREVENTED
PAIN_FUNCTIONAL_ASSESSMENT: 0-10

## 2023-08-17 ASSESSMENT — PAIN DESCRIPTION - DESCRIPTORS: DESCRIPTORS: ACHING

## 2023-08-17 ASSESSMENT — PAIN DESCRIPTION - LOCATION: LOCATION: NECK

## 2023-08-17 ASSESSMENT — PAIN DESCRIPTION - ORIENTATION: ORIENTATION: POSTERIOR

## 2023-08-17 ASSESSMENT — PAIN DESCRIPTION - PAIN TYPE: TYPE: ACUTE PAIN

## 2023-08-17 NOTE — ED PROVIDER NOTES
[address] [facility] ENCOUNTER          ATTENDING PHYSICIAN NOTE       Date of evaluation: 8/17/2023      Assessment/ Medical Decion Making     MDM: Sarah Cedeño is a 40 y.o. female with history of tobacco use who presents for evaluation of pain to the back of her head where she has an associated area of swelling. On exam this is most consistent with local infectious process such as cellulitis, abscess, lymphadenitis. I did perform bedside ultrasound which demonstrated hypodense well-circumscribed collection in the subcutaneous tissue without internal or surrounding Dopplers most consistent with fluid collection. We discussed risk and benefits of incision and drainage  and patient in agreement with proceeding with this. During procedure I was unable to yield fluid and so perforned real-time ultrasound demonstrating blunt tipped hemostats within cavity visualized on ultrasound with no return of fluid, not consistent with abscess. Patient counseled about expectant management, wound healing and treatment with antibiotics for soft tissue infection. With regards to her other symptoms believe she likely has a viral syndrome. I reviewed her labs done at [facility] ED which demonstrated leukocytosis. This is the main reason for my repeating labs today but her leukocytosis has resolved. The remainder of her labs are also unremarkable. Do not feel that she needs additional testing or treatment at this time. She was given a referral to PCP for follow-up and counseled to return for worsening symptoms. Medical Decision Making  Amount and/or Complexity of Data Reviewed  Labs: ordered. Risk  OTC drugs. Prescription drug management. Jorge A Austin MD  3:58 PM    Clinical Impression     1. Cellulitis of head except face    2.  Wheezing        Disposition     DISPOSITION Decision To Discharge 08/17/2023 12:14:38 PM        Chief Complaint     Neck Pain and Abscess (Lump on back of 8.3 - 10.6 mg/dL    Total Protein 7.5 6.4 - 8.2 g/dL    Albumin 4.1 3.4 - 5.0 g/dL    Albumin/Globulin Ratio 1.2 1.1 - 2.2    Total Bilirubin 0.4 0.0 - 1.0 mg/dL    Alkaline Phosphatase 73 40 - 129 U/L    ALT 13 10 - 40 U/L    AST 15 15 - 37 U/L   Lipase   Result Value Ref Range    Lipase 25.0 13.0 - 60.0 U/L       CONSULTS:  None    PATIENT REFERRED TO:  [facility] Emergency Department  [address] [facility] [address]  [address]  [phone]    As needed, If symptoms worsen    [address]      You should receive a call from the clinic to schedule an appointment to get a primary care doctor. You can also call them yourself to schedule. DISCHARGE MEDICATIONS:  Discharge Medication List as of 8/17/2023 12:31 PM        START taking these medications    Details   doxycycline hyclate (VIBRA-TABS) 100 MG tablet Take 1 tablet by mouth 2 times daily for 7 days, Disp-14 tablet, R-0Normal      amoxicillin (AMOXIL) 875 MG tablet Take 1 tablet by mouth 2 times daily for 7 days, Disp-14 tablet, R-0Normal      !! albuterol sulfate HFA (VENTOLIN HFA) 108 (90 Base) MCG/ACT inhaler Inhale 2 puffs into the lungs 4 times daily as needed for Wheezing, Disp-18 g, R-0Normal       !! - Potential duplicate medications found. Please discuss with provider.              Tony Bowers MD  08/17/23 4628

## 2023-12-27 ENCOUNTER — APPOINTMENT (OUTPATIENT)
Dept: GENERAL RADIOLOGY | Age: 43
End: 2023-12-27
Payer: MEDICAID

## 2023-12-27 ENCOUNTER — HOSPITAL ENCOUNTER (EMERGENCY)
Age: 43
Discharge: HOME | End: 2023-12-27
Attending: EMERGENCY MEDICINE
Payer: MEDICAID

## 2023-12-27 VITALS
TEMPERATURE: 98.3 F | SYSTOLIC BLOOD PRESSURE: 125 MMHG | OXYGEN SATURATION: 99 % | DIASTOLIC BLOOD PRESSURE: 88 MMHG | BODY MASS INDEX: 34.99 KG/M2 | WEIGHT: 210 LBS | HEART RATE: 72 BPM | HEIGHT: 65 IN | RESPIRATION RATE: 15 BRPM

## 2023-12-27 DIAGNOSIS — M79.601 MUSCULOSKELETAL PAIN OF RIGHT UPPER EXTREMITY: ICD-10-CM

## 2023-12-27 DIAGNOSIS — R07.9 CHEST PAIN, UNSPECIFIED TYPE: Primary | ICD-10-CM

## 2023-12-27 LAB
ALBUMIN SERPL-MCNC: 4.1 G/DL (ref 3.4–5)
ALBUMIN/GLOB SERPL: 1.4 {RATIO} (ref 1.1–2.2)
ALP SERPL-CCNC: 86 U/L (ref 40–129)
ALT SERPL-CCNC: 11 U/L (ref 10–40)
ANION GAP SERPL CALCULATED.3IONS-SCNC: 12 MMOL/L (ref 3–16)
AST SERPL-CCNC: 12 U/L (ref 15–37)
BASOPHILS # BLD: 0.1 K/UL (ref 0–0.2)
BASOPHILS NFR BLD: 0.7 %
BILIRUB SERPL-MCNC: <0.2 MG/DL (ref 0–1)
BUN SERPL-MCNC: 6 MG/DL (ref 7–20)
CALCIUM SERPL-MCNC: 9.4 MG/DL (ref 8.3–10.6)
CHLORIDE SERPL-SCNC: 106 MMOL/L (ref 99–110)
CK SERPL-CCNC: 79 U/L (ref 26–192)
CO2 SERPL-SCNC: 22 MMOL/L (ref 21–32)
CREAT SERPL-MCNC: <0.5 MG/DL (ref 0.6–1.1)
D DIMER: <0.27 UG/ML FEU (ref 0–0.6)
DEPRECATED RDW RBC AUTO: 12.8 % (ref 12.4–15.4)
EOSINOPHIL # BLD: 0.2 K/UL (ref 0–0.6)
EOSINOPHIL NFR BLD: 1.8 %
GFR SERPLBLD CREATININE-BSD FMLA CKD-EPI: >60 ML/MIN/{1.73_M2}
GLUCOSE SERPL-MCNC: 101 MG/DL (ref 70–99)
HCT VFR BLD AUTO: 39.3 % (ref 36–48)
HGB BLD-MCNC: 13.6 G/DL (ref 12–16)
LYMPHOCYTES # BLD: 2.7 K/UL (ref 1–5.1)
LYMPHOCYTES NFR BLD: 23.3 %
MAGNESIUM SERPL-MCNC: 1.6 MG/DL (ref 1.8–2.4)
MCH RBC QN AUTO: 30.5 PG (ref 26–34)
MCHC RBC AUTO-ENTMCNC: 34.6 G/DL (ref 31–36)
MCV RBC AUTO: 88 FL (ref 80–100)
MONOCYTES # BLD: 0.6 K/UL (ref 0–1.3)
MONOCYTES NFR BLD: 4.9 %
NEUTROPHILS # BLD: 7.9 K/UL (ref 1.7–7.7)
NEUTROPHILS NFR BLD: 69.3 %
NT-PROBNP SERPL-MCNC: 143 PG/ML (ref 0–124)
PLATELET # BLD AUTO: 336 K/UL (ref 135–450)
PMV BLD AUTO: 6.7 FL (ref 5–10.5)
POTASSIUM SERPL-SCNC: 3.4 MMOL/L (ref 3.5–5.1)
PROT SERPL-MCNC: 7.1 G/DL (ref 6.4–8.2)
RBC # BLD AUTO: 4.47 M/UL (ref 4–5.2)
SODIUM SERPL-SCNC: 140 MMOL/L (ref 136–145)
TROPONIN, HIGH SENSITIVITY: <6 NG/L (ref 0–14)
TROPONIN, HIGH SENSITIVITY: <6 NG/L (ref 0–14)
WBC # BLD AUTO: 11.4 K/UL (ref 4–11)

## 2023-12-27 PROCEDURE — 83880 ASSAY OF NATRIURETIC PEPTIDE: CPT

## 2023-12-27 PROCEDURE — 85379 FIBRIN DEGRADATION QUANT: CPT

## 2023-12-27 PROCEDURE — 6360000002 HC RX W HCPCS: Performed by: EMERGENCY MEDICINE

## 2023-12-27 PROCEDURE — 96375 TX/PRO/DX INJ NEW DRUG ADDON: CPT

## 2023-12-27 PROCEDURE — 96374 THER/PROPH/DIAG INJ IV PUSH: CPT

## 2023-12-27 PROCEDURE — 93005 ELECTROCARDIOGRAM TRACING: CPT | Performed by: EMERGENCY MEDICINE

## 2023-12-27 PROCEDURE — 71045 X-RAY EXAM CHEST 1 VIEW: CPT

## 2023-12-27 PROCEDURE — 85025 COMPLETE CBC W/AUTO DIFF WBC: CPT

## 2023-12-27 PROCEDURE — 84484 ASSAY OF TROPONIN QUANT: CPT

## 2023-12-27 PROCEDURE — 2580000003 HC RX 258: Performed by: EMERGENCY MEDICINE

## 2023-12-27 PROCEDURE — 99285 EMERGENCY DEPT VISIT HI MDM: CPT

## 2023-12-27 PROCEDURE — 83735 ASSAY OF MAGNESIUM: CPT

## 2023-12-27 PROCEDURE — 82550 ASSAY OF CK (CPK): CPT

## 2023-12-27 PROCEDURE — 6370000000 HC RX 637 (ALT 250 FOR IP): Performed by: EMERGENCY MEDICINE

## 2023-12-27 PROCEDURE — 80053 COMPREHEN METABOLIC PANEL: CPT

## 2023-12-27 RX ORDER — ORPHENADRINE CITRATE 100 MG/1
100 TABLET, EXTENDED RELEASE ORAL 2 TIMES DAILY
Qty: 20 TABLET | Refills: 0 | Status: SHIPPED | OUTPATIENT
Start: 2023-12-27 | End: 2024-01-06

## 2023-12-27 RX ORDER — 0.9 % SODIUM CHLORIDE 0.9 %
1000 INTRAVENOUS SOLUTION INTRAVENOUS ONCE
Status: COMPLETED | OUTPATIENT
Start: 2023-12-27 | End: 2023-12-27

## 2023-12-27 RX ORDER — ORPHENADRINE CITRATE 30 MG/ML
60 INJECTION INTRAMUSCULAR; INTRAVENOUS ONCE
Status: COMPLETED | OUTPATIENT
Start: 2023-12-27 | End: 2023-12-27

## 2023-12-27 RX ORDER — KETOROLAC TROMETHAMINE 15 MG/ML
15 INJECTION, SOLUTION INTRAMUSCULAR; INTRAVENOUS ONCE
Status: COMPLETED | OUTPATIENT
Start: 2023-12-27 | End: 2023-12-27

## 2023-12-27 RX ORDER — ASPIRIN 81 MG/1
324 TABLET, CHEWABLE ORAL ONCE
Status: COMPLETED | OUTPATIENT
Start: 2023-12-27 | End: 2023-12-27

## 2023-12-27 RX ORDER — LANOLIN ALCOHOL/MO/W.PET/CERES
800 CREAM (GRAM) TOPICAL ONCE
Status: COMPLETED | OUTPATIENT
Start: 2023-12-27 | End: 2023-12-27

## 2023-12-27 RX ADMIN — ASPIRIN 324 MG: 81 TABLET, CHEWABLE ORAL at 19:12

## 2023-12-27 RX ADMIN — SODIUM CHLORIDE 1000 ML: 9 INJECTION, SOLUTION INTRAVENOUS at 19:13

## 2023-12-27 RX ADMIN — Medication 800 MG: at 21:14

## 2023-12-27 RX ADMIN — ORPHENADRINE CITRATE 60 MG: 60 INJECTION INTRAMUSCULAR; INTRAVENOUS at 19:16

## 2023-12-27 RX ADMIN — KETOROLAC TROMETHAMINE 15 MG: 15 INJECTION, SOLUTION INTRAMUSCULAR; INTRAVENOUS at 19:16

## 2023-12-27 RX ADMIN — POTASSIUM BICARBONATE 20 MEQ: 782 TABLET, EFFERVESCENT ORAL at 21:14

## 2023-12-27 NOTE — ED PROVIDER NOTES
Physician-In-Triage Note    I performed a medical screening examination and evaluated this patient briefly with the purpose of initiating their ED workup in an expeditious manner. Please see notes from other ED providers regarding comprehensive evaluation including full history, physical exam, interpretation of results, and medical decision making/disposition. In brief, Yoli Garnett is a 44 y.o. female who presents to the ED complaining of chest pain headache and neck pain \"like something attacking my heart. \"  Feels SOB. Ongoing x2wk. No cough/cold sx. Focused physical examination notable for no acute distress, well-appearing, well-nourished, normal speech and mentation without obvious facial droop, no obvious rash. No obvious cranial nerve deficits on my initial exam.  RRR CTAB. Vitals reviewed per nursing documentation. Triage orders placed, including labs, CXR, labs    I did not personally review any of the results of these tests, which will be reviewed and interpreted later by ED providers at the time of the patient's more comprehensive evaluation.         Mikey Gaitan MD  12/27/23 5860

## 2023-12-27 NOTE — ED TRIAGE NOTES
42y/o female presents to the ED with right side chest pain x2weeks intermittently. Pain started yesterday with fatigue. No cough, -n/v/f/c/d. Pt states bilateral jaw pain and left arm pain.  Pt describes \"stabbing and pulling\" pain in chest.

## 2023-12-28 LAB
EKG ATRIAL RATE: 80 BPM
EKG DIAGNOSIS: NORMAL
EKG P AXIS: 28 DEGREES
EKG P-R INTERVAL: 178 MS
EKG Q-T INTERVAL: 384 MS
EKG QRS DURATION: 84 MS
EKG QTC CALCULATION (BAZETT): 442 MS
EKG R AXIS: -5 DEGREES
EKG T AXIS: 42 DEGREES
EKG VENTRICULAR RATE: 80 BPM

## 2023-12-28 PROCEDURE — 93010 ELECTROCARDIOGRAM REPORT: CPT | Performed by: INTERNAL MEDICINE

## 2023-12-28 NOTE — ED PROVIDER NOTES
[address]            Pt Name: Yoli Garnett   MRN: [id number]   [address] [facility] 1980   Date of evaluation: 12/27/2023   Provider: Tk Ortega MD   PCP: System, Referring Not In (Inactive)   Note Started: 7:02 PM EST 12/27/23          CHIEF COMPLAINT     Chief Complaint   Patient presents with    Chest Pain             HISTORY OF PRESENT ILLNESS:           Yoli Garnett is a 44 y.o. female who presents to the emergency room with a chief complaint of chest pain. Right sided chest, back, arm and head pain. Started a couple weeks ago but today it got worse. It has been intermittent. Usually if she sleeps she will feel better but that did not happen today. It hurts at rest and with movement. She feels SOB. She is right handed. No URI sxs, no fevers. She is a smoker. No hormonal medicines. No hemoptysis. No immobilizations. No HTN, DM, HLD. She thinks her dad had a heart attack before the age of 48. This episode of pain started on [place] day     Nursing Notes were all reviewed and agreed with, or any disagreements were addressed in the HPI. REVIEW OF SYSTEMS :    Positives and Pertinent negatives as per HPI. MEDICAL HISTORY   has a past medical history of Gonorrhea.     Past Surgical History:   Procedure Laterality Date    HYSTERECTOMY (CERVIX STATUS UNKNOWN)         [facility]       Discharge Medication List as of 12/27/2023  9:43 PM        CONTINUE these medications which have NOT CHANGED    Details   !! albuterol sulfate HFA (VENTOLIN HFA) 108 (90 Base) MCG/ACT inhaler Inhale 2 puffs into the lungs 4 times daily as needed for Wheezing, Disp-18 g, R-0Normal      ibuprofen (IBU) 600 MG tablet Take 1 tablet by mouth every 6 hours as needed for Pain, Disp-40 tablet, R-0Normal      acetaminophen (TYLENOL) 500 MG tablet Take 1 tablet by mouth 4 times daily as needed for Pain, Disp-120 tablet, R-0Normal      ondansetron (ZOFRAN) 4

## 2024-01-15 ENCOUNTER — HOSPITAL ENCOUNTER (OUTPATIENT)
Dept: PHYSICAL THERAPY | Age: 44
Setting detail: THERAPIES SERIES
Discharge: HOME | End: 2024-01-15
Attending: EMERGENCY MEDICINE
Payer: MEDICAID

## 2024-01-15 DIAGNOSIS — M79.605 LEFT LEG PAIN: ICD-10-CM

## 2024-01-15 DIAGNOSIS — M25.562 PAIN IN BOTH KNEES, UNSPECIFIED CHRONICITY: ICD-10-CM

## 2024-01-15 DIAGNOSIS — M79.602 LEFT ARM PAIN: ICD-10-CM

## 2024-01-15 DIAGNOSIS — M25.561 PAIN IN BOTH KNEES, UNSPECIFIED CHRONICITY: ICD-10-CM

## 2024-01-15 DIAGNOSIS — M25.511 BILATERAL SHOULDER PAIN, UNSPECIFIED CHRONICITY: Primary | ICD-10-CM

## 2024-01-15 DIAGNOSIS — M25.512 BILATERAL SHOULDER PAIN, UNSPECIFIED CHRONICITY: Primary | ICD-10-CM

## 2024-01-15 PROCEDURE — G0283 ELEC STIM OTHER THAN WOUND: HCPCS

## 2024-01-15 PROCEDURE — 97110 THERAPEUTIC EXERCISES: CPT

## 2024-01-15 PROCEDURE — 97161 PT EVAL LOW COMPLEX 20 MIN: CPT

## 2024-01-15 NOTE — FLOWSHEET NOTE
(<30days) to assess goals progression       CHARGE CAPTURE     PT CHARGE GRID   CPT Code (TIMED) minutes # CPT Code (UNTIMED) #     Therex (49483)  13 1  EVAL:LOW (34590 - Typically 20 minutes face-to-face) 1    Neuromusc. Re-ed (85361)    Re-Eval (73610)     Manual (38762)    Estim Unattended (88778)     Ther. Act (66639)    Mech. Traction (47329)     Gait (30256)    Dry Needle 1-2 muscle (92996)     Aquatic Therex (47229)    Dry Needle 3+ muscle (20561)     Iontophoresis (83432)    VASO (22385)     Ultrasound (49668)    Group Therapy (51591)     Estim Attended (52269) 15 1  Canalith Repositioning (27672)     Other:    Other:    Total Timed Code Tx Minutes 28 2  1     Total Treatment Minutes 48        Charge Justification:  (36648) THERAPEUTIC EXERCISE - Provided verbal/tactile cueing for activities related to strengthening, flexibility, endurance, ROM performed to prevent loss of range of motion, maintain or improve muscular strength or increase flexibility, following either an injury or surgery.   (99487) UNATTENDED ESTIM. Electrical stimulation (unattended), to 1 or more areas for indication(s) other than wound care, as part of a therapy plan of care. ([facility] )  N/A - EVAL ONLY    TREATMENT PLAN   Plan: POC Initiated today- see eval for details    Electronically Signed by Christine Jacques, PT              Date: 01/15/2024     Note: If patient does not return for scheduled/recommended follow up visits, this note will serve as a discharge from care along with the most recent update on progress.

## 2024-01-15 NOTE — PLAN OF CARE
[facility]- Outpatient Rehabilitation and Therapy [address], [address], [place] [address] office: [phone] fax: [phone]     Physical Therapy Certification      Dear Susana Gutierrez MD,    We had the pleasure of evaluating the following patient for physical therapy services at [facility].  A summary of our findings can be found in the initial assessment below.  This includes our plan of care.  If you have any questions or concerns regarding these findings, please do not hesitate to contact me at the office phone number listed above.  Thank you for the referral.     Physician Signature:_______________________________Date:__________________  By signing above (or electronic signature), therapist’s plan is approved by physician       Physical Therapy: Initial Evaluation   Patient: Gunnar Burns (43 y.o. female)   Examination Date: 01/15/2024   :  1980 MRN: [id number]   Visit #: 1    Insurance: Payor: [employer] [place] / Plan: [facility] / Product Type: *No Product type* /   Insurance ID: [id number] - (Medicaid Managed)  Secondary Insurance (if applicable):    Treatment Diagnosis:    ICD-10-CM    1. Bilateral shoulder pain, unspecified chronicity  M25.511     M25.512       2. Pain in both knees, unspecified chronicity  M25.561     M25.562       3. Left arm pain  M79.602       4. Left leg pain  M79.605           Medical Diagnosis:  Musculoskeletal pain of right upper extremity [M79.601]   Referring Physician: Susana Gutierrez MD  PCP: System, Referring Not In (Inactive)                              Precautions/ Contra-indications:   Latex allergy:   No  Pacemaker:     No  Other:   N/A  Surgical Date: N/A    Red Flags:  None    C-SSRS Triggered by Intake questionnaire:   [x] No, Questionnaire did not trigger screening.   [] Yes, Patient intake triggered further evaluation      [] C-SSRS Screening completed  [] PCP notified via

## 2024-01-24 ENCOUNTER — HOSPITAL ENCOUNTER (OUTPATIENT)
Dept: PHYSICAL THERAPY | Age: 44
Setting detail: THERAPIES SERIES
Discharge: HOME | End: 2024-01-24
Attending: EMERGENCY MEDICINE
Payer: MEDICAID

## 2024-01-24 PROCEDURE — 97110 THERAPEUTIC EXERCISES: CPT

## 2024-01-24 NOTE — FLOWSHEET NOTE
[facility]- Outpatient Rehabilitation [facility] Therapy [address], [place] [address] office: [phone] fax: [phone]      Physical Therapy: TREATMENT/PROGRESS NOTE   Patient: Gunnar Burns (43 y.o. female)   Treatment Date: 2024   :  1980 MRN: [id number]   Visit #: 2   Insurance Allowable:   Auth Required: [x]NO      []YES Insurance: Payor: [employer] [place] / Plan: [facility] / Product Type: *No Product type* /   Insurance ID: [id number] - (Medicaid Managed)  Secondary Insurance (if applicable):    Treatment Diagnosis:       1. Bilateral shoulder pain, unspecified chronicity  M25.511       M25.512         2. Pain in both knees, unspecified chronicity  M25.561       M25.562         3. Left arm pain  M79.602         4. Left leg pain  M79.605            Medical Diagnosis:    Musculoskeletal pain of right upper extremity [M79.601]   Referring Physician: Susana Gutierrez MD  PCP: System, Referring Not In (Inactive)                             Plan of care signed (Y/N): Y    Date of Patient follow up with Physician:      Progress Report/POC: NO  POC update due: 24 (OR 10 visits /OR AUTH LIMITS, whichever is less)     Precautions/ Contra-indications:   Latex allergy:   No  Pacemaker:     No  Other:                    N/A  Surgical Date:       N/A     Red Flags: none    Preferred Language for Healthcare:   [x]English       []other:    SUBJECTIVE EXAMINATION     Patient Report/Comments: pt requesting treatment on knees today, shoulders and back are not as bad today, reports getting some relief from taping done last visit and requesting taping again today     Test used Initial score  1/15/24 2024   Pain Summary VAS 8-10/10 L knee 8/10, R knee 6/10   Patient Questionnaire Upper Extremity functional Scale 21/80 (26%)    Other:              OBJECTIVE EXAMINATION     Observation: FHP,rounded shoulder girdle, lateral tracking L

## 2024-10-25 ENCOUNTER — HOSPITAL ENCOUNTER (EMERGENCY)
Age: 44
Discharge: HOME | End: 2024-10-25
Attending: EMERGENCY MEDICINE

## 2024-10-25 ENCOUNTER — APPOINTMENT (OUTPATIENT)
Dept: GENERAL RADIOLOGY | Age: 44
End: 2024-10-25

## 2024-10-25 VITALS
OXYGEN SATURATION: 100 % | HEIGHT: 65 IN | WEIGHT: 227.7 LBS | RESPIRATION RATE: 17 BRPM | DIASTOLIC BLOOD PRESSURE: 78 MMHG | TEMPERATURE: 99 F | BODY MASS INDEX: 37.94 KG/M2 | HEART RATE: 87 BPM | SYSTOLIC BLOOD PRESSURE: 130 MMHG

## 2024-10-25 DIAGNOSIS — Z53.21 PATIENT LEFT AFTER TRIAGE: Primary | ICD-10-CM

## 2024-10-25 LAB
FLUAV RNA UPPER RESP QL NAA+PROBE: NEGATIVE
FLUBV AG NPH QL: NEGATIVE
SARS-COV-2 RDRP RESP QL NAA+PROBE: NOT DETECTED

## 2024-10-25 PROCEDURE — 87804 INFLUENZA ASSAY W/OPTIC: CPT

## 2024-10-25 PROCEDURE — 87502 INFLUENZA DNA AMP PROBE: CPT

## 2024-10-25 PROCEDURE — 87635 SARS-COV-2 COVID-19 AMP PRB: CPT

## 2024-10-25 ASSESSMENT — PAIN - FUNCTIONAL ASSESSMENT: PAIN_FUNCTIONAL_ASSESSMENT: 0-10

## 2024-10-25 ASSESSMENT — PAIN SCALES - GENERAL: PAINLEVEL_OUTOF10: 10

## 2024-10-25 ASSESSMENT — PAIN DESCRIPTION - LOCATION: LOCATION: GENERALIZED

## 2024-10-25 NOTE — ED NOTES
Chest x-ray ordered, pt informed and in agreement, however declined when radiology came to room to get patient for x-ray, stating she wanted to leave. RN/ MD informed. Pt not in room when provider went to speak with patient.

## 2024-10-25 NOTE — ED PROVIDER NOTES
I was happy to see this patient.  It was a very busy day at [facility].  Nurse reported that she was in no acute distress and otherwise well-appearing when they initially saw her, although was slightly tachycardic.  I was told she was ready to leave once her swabs came back.  When I was told this, I had to disposition another patient first, but tried to hurry into the room as quickly as I could to see her before leaving.  By the time I got to her room, she had already left the department.  Her tachycardia did resolve shortly after being in the emergency department per nurse.  If she changes her mind, she is always welcome back to the emergency department, but again she left after nurse triage and I did not see the patient.    Impression: Left after nurse triage      Pepe Law MD  10/26/24 4954